# Patient Record
Sex: FEMALE | Race: BLACK OR AFRICAN AMERICAN | NOT HISPANIC OR LATINO
[De-identification: names, ages, dates, MRNs, and addresses within clinical notes are randomized per-mention and may not be internally consistent; named-entity substitution may affect disease eponyms.]

---

## 2017-01-12 ENCOUNTER — APPOINTMENT (OUTPATIENT)
Dept: FAMILY MEDICINE | Facility: CLINIC | Age: 42
End: 2017-01-12

## 2017-01-12 VITALS
RESPIRATION RATE: 20 BRPM | HEIGHT: 68 IN | BODY MASS INDEX: 44.41 KG/M2 | HEART RATE: 86 BPM | DIASTOLIC BLOOD PRESSURE: 70 MMHG | WEIGHT: 293 LBS | SYSTOLIC BLOOD PRESSURE: 126 MMHG

## 2017-01-12 RX ORDER — CYANOCOBALAMIN 1000 UG/ML
1000 INJECTION INTRAMUSCULAR; SUBCUTANEOUS
Qty: 0 | Refills: 0 | Status: COMPLETED | OUTPATIENT
Start: 2017-01-12

## 2017-01-12 RX ADMIN — CYANOCOBALAMIN 1 MCG/ML: 1000 INJECTION INTRAMUSCULAR; SUBCUTANEOUS at 00:00

## 2017-01-17 ENCOUNTER — CLINICAL ADVICE (OUTPATIENT)
Age: 42
End: 2017-01-17

## 2017-01-18 ENCOUNTER — OUTPATIENT (OUTPATIENT)
Dept: OUTPATIENT SERVICES | Facility: HOSPITAL | Age: 42
LOS: 1 days | End: 2017-01-18
Payer: MEDICARE

## 2017-01-18 DIAGNOSIS — K08.9 DISORDER OF TEETH AND SUPPORTING STRUCTURES, UNSPECIFIED: ICD-10-CM

## 2017-01-18 PROCEDURE — D0140: CPT

## 2017-01-23 ENCOUNTER — APPOINTMENT (OUTPATIENT)
Dept: FAMILY MEDICINE | Facility: CLINIC | Age: 42
End: 2017-01-23

## 2017-01-23 DIAGNOSIS — Z01.20 ENCOUNTER FOR DENTAL EXAMINATION AND CLEANING WITHOUT ABNORMAL FINDINGS: ICD-10-CM

## 2017-02-06 ENCOUNTER — APPOINTMENT (OUTPATIENT)
Dept: FAMILY MEDICINE | Facility: CLINIC | Age: 42
End: 2017-02-06

## 2017-02-06 ENCOUNTER — RX RENEWAL (OUTPATIENT)
Age: 42
End: 2017-02-06

## 2017-02-07 ENCOUNTER — RX RENEWAL (OUTPATIENT)
Age: 42
End: 2017-02-07

## 2017-02-10 ENCOUNTER — RX RENEWAL (OUTPATIENT)
Age: 42
End: 2017-02-10

## 2017-02-16 ENCOUNTER — APPOINTMENT (OUTPATIENT)
Dept: FAMILY MEDICINE | Facility: CLINIC | Age: 42
End: 2017-02-16

## 2017-03-03 ENCOUNTER — APPOINTMENT (OUTPATIENT)
Dept: FAMILY MEDICINE | Facility: CLINIC | Age: 42
End: 2017-03-03

## 2017-03-03 VITALS
HEART RATE: 78 BPM | HEIGHT: 68 IN | DIASTOLIC BLOOD PRESSURE: 78 MMHG | BODY MASS INDEX: 44.41 KG/M2 | WEIGHT: 293 LBS | SYSTOLIC BLOOD PRESSURE: 132 MMHG | RESPIRATION RATE: 20 BRPM

## 2017-03-03 RX ORDER — CYANOCOBALAMIN 1000 UG/ML
1000 INJECTION INTRAMUSCULAR; SUBCUTANEOUS
Qty: 0 | Refills: 0 | Status: COMPLETED | OUTPATIENT
Start: 2017-03-03

## 2017-03-03 RX ADMIN — CYANOCOBALAMIN 0 MCG/ML: 1000 INJECTION INTRAMUSCULAR; SUBCUTANEOUS at 00:00

## 2017-03-07 ENCOUNTER — RX RENEWAL (OUTPATIENT)
Age: 42
End: 2017-03-07

## 2017-03-07 LAB
25(OH)D3 SERPL-MCNC: 19.8 NG/ML
ALBUMIN SERPL ELPH-MCNC: 4.2 G/DL
ALP BLD-CCNC: 70 U/L
ALT SERPL-CCNC: 11 U/L
ANION GAP SERPL CALC-SCNC: 16 MMOL/L
AST SERPL-CCNC: 9 U/L
BASOPHILS # BLD AUTO: 0.03 K/UL
BASOPHILS NFR BLD AUTO: 0.3 %
BILIRUB SERPL-MCNC: <0.2 MG/DL
BUN SERPL-MCNC: 11 MG/DL
CALCIUM SERPL-MCNC: 9.5 MG/DL
CHLORIDE SERPL-SCNC: 104 MMOL/L
CHOLEST SERPL-MCNC: 204 MG/DL
CHOLEST/HDLC SERPL: 5.2 RATIO
CO2 SERPL-SCNC: 20 MMOL/L
CREAT SERPL-MCNC: 0.77 MG/DL
EOSINOPHIL # BLD AUTO: 0.12 K/UL
EOSINOPHIL NFR BLD AUTO: 1.3 %
GLUCOSE SERPL-MCNC: 137 MG/DL
HBA1C MFR BLD HPLC: 6.5 %
HCT VFR BLD CALC: 38.6 %
HDLC SERPL-MCNC: 39 MG/DL
HGB BLD-MCNC: 12.7 G/DL
IMM GRANULOCYTES NFR BLD AUTO: 0.2 %
LDLC SERPL CALC-MCNC: 122 MG/DL
LYMPHOCYTES # BLD AUTO: 2.75 K/UL
LYMPHOCYTES NFR BLD AUTO: 29.5 %
MAN DIFF?: NORMAL
MCHC RBC-ENTMCNC: 27.5 PG
MCHC RBC-ENTMCNC: 32.9 GM/DL
MCV RBC AUTO: 83.7 FL
MONOCYTES # BLD AUTO: 0.6 K/UL
MONOCYTES NFR BLD AUTO: 6.4 %
NEUTROPHILS # BLD AUTO: 5.8 K/UL
NEUTROPHILS NFR BLD AUTO: 62.3 %
PLATELET # BLD AUTO: 328 K/UL
POTASSIUM SERPL-SCNC: 4 MMOL/L
PROT SERPL-MCNC: 6.7 G/DL
RBC # BLD: 4.61 M/UL
RBC # FLD: 15.3 %
SODIUM SERPL-SCNC: 140 MMOL/L
TRIGL SERPL-MCNC: 215 MG/DL
VIT B12 SERPL-MCNC: 316 PG/ML
WBC # FLD AUTO: 9.32 K/UL

## 2017-03-17 ENCOUNTER — RX RENEWAL (OUTPATIENT)
Age: 42
End: 2017-03-17

## 2017-03-23 ENCOUNTER — RX RENEWAL (OUTPATIENT)
Age: 42
End: 2017-03-23

## 2017-03-29 ENCOUNTER — OUTPATIENT (OUTPATIENT)
Dept: OUTPATIENT SERVICES | Facility: HOSPITAL | Age: 42
LOS: 1 days | End: 2017-03-29
Payer: MEDICARE

## 2017-03-29 DIAGNOSIS — K08.9 DISORDER OF TEETH AND SUPPORTING STRUCTURES, UNSPECIFIED: ICD-10-CM

## 2017-03-29 PROCEDURE — D0140: CPT

## 2017-03-29 PROCEDURE — D0220: CPT

## 2017-03-31 DIAGNOSIS — Z01.20 ENCOUNTER FOR DENTAL EXAMINATION AND CLEANING WITHOUT ABNORMAL FINDINGS: ICD-10-CM

## 2017-04-03 ENCOUNTER — APPOINTMENT (OUTPATIENT)
Dept: FAMILY MEDICINE | Facility: CLINIC | Age: 42
End: 2017-04-03

## 2017-04-03 VITALS
WEIGHT: 293 LBS | HEART RATE: 76 BPM | HEIGHT: 68 IN | BODY MASS INDEX: 44.41 KG/M2 | DIASTOLIC BLOOD PRESSURE: 70 MMHG | SYSTOLIC BLOOD PRESSURE: 124 MMHG | RESPIRATION RATE: 20 BRPM

## 2017-04-03 RX ORDER — CYANOCOBALAMIN 1000 UG/ML
1000 INJECTION INTRAMUSCULAR; SUBCUTANEOUS
Qty: 0 | Refills: 0 | Status: COMPLETED | OUTPATIENT
Start: 2017-04-03

## 2017-04-03 RX ADMIN — CYANOCOBALAMIN 0 MCG/ML: 1000 INJECTION INTRAMUSCULAR; SUBCUTANEOUS at 00:00

## 2017-04-06 ENCOUNTER — RX RENEWAL (OUTPATIENT)
Age: 42
End: 2017-04-06

## 2017-04-21 ENCOUNTER — CLINICAL ADVICE (OUTPATIENT)
Age: 42
End: 2017-04-21

## 2017-04-21 ENCOUNTER — APPOINTMENT (OUTPATIENT)
Dept: FAMILY MEDICINE | Facility: CLINIC | Age: 42
End: 2017-04-21

## 2017-04-21 VITALS
TEMPERATURE: 98.4 F | HEART RATE: 80 BPM | DIASTOLIC BLOOD PRESSURE: 84 MMHG | SYSTOLIC BLOOD PRESSURE: 130 MMHG | RESPIRATION RATE: 16 BRPM | WEIGHT: 293 LBS | HEIGHT: 68 IN | BODY MASS INDEX: 44.41 KG/M2

## 2017-04-21 DIAGNOSIS — T14.8 OTHER INJURY OF UNSPECIFIED BODY REGION: ICD-10-CM

## 2017-04-28 ENCOUNTER — RX RENEWAL (OUTPATIENT)
Age: 42
End: 2017-04-28

## 2017-05-01 ENCOUNTER — RX RENEWAL (OUTPATIENT)
Age: 42
End: 2017-05-01

## 2017-05-08 ENCOUNTER — APPOINTMENT (OUTPATIENT)
Dept: FAMILY MEDICINE | Facility: CLINIC | Age: 42
End: 2017-05-08

## 2017-05-11 ENCOUNTER — MED ADMIN CHARGE (OUTPATIENT)
Age: 42
End: 2017-05-11

## 2017-05-11 ENCOUNTER — APPOINTMENT (OUTPATIENT)
Dept: FAMILY MEDICINE | Facility: CLINIC | Age: 42
End: 2017-05-11

## 2017-05-11 VITALS
DIASTOLIC BLOOD PRESSURE: 78 MMHG | SYSTOLIC BLOOD PRESSURE: 122 MMHG | OXYGEN SATURATION: 93 % | TEMPERATURE: 99.1 F | RESPIRATION RATE: 15 BRPM | HEART RATE: 88 BPM

## 2017-05-11 DIAGNOSIS — Z87.09 PERSONAL HISTORY OF OTHER DISEASES OF THE RESPIRATORY SYSTEM: ICD-10-CM

## 2017-05-11 RX ORDER — CYANOCOBALAMIN 1000 UG/ML
1000 INJECTION INTRAMUSCULAR; SUBCUTANEOUS
Qty: 0 | Refills: 0 | Status: COMPLETED | OUTPATIENT
Start: 2017-05-11

## 2017-05-11 RX ORDER — CEPHALEXIN 500 MG/1
500 CAPSULE ORAL TWICE DAILY
Qty: 14 | Refills: 0 | Status: DISCONTINUED | COMMUNITY
Start: 2017-04-21 | End: 2017-05-11

## 2017-05-11 RX ADMIN — CYANOCOBALAMIN 0 MCG/ML: 1000 INJECTION INTRAMUSCULAR; SUBCUTANEOUS at 00:00

## 2017-05-30 ENCOUNTER — RX RENEWAL (OUTPATIENT)
Age: 42
End: 2017-05-30

## 2017-06-16 ENCOUNTER — APPOINTMENT (OUTPATIENT)
Dept: FAMILY MEDICINE | Facility: CLINIC | Age: 42
End: 2017-06-16

## 2017-06-27 ENCOUNTER — RX RENEWAL (OUTPATIENT)
Age: 42
End: 2017-06-27

## 2017-07-18 ENCOUNTER — RX RENEWAL (OUTPATIENT)
Age: 42
End: 2017-07-18

## 2017-07-18 DIAGNOSIS — J30.9 ALLERGIC RHINITIS, UNSPECIFIED: ICD-10-CM

## 2017-07-24 ENCOUNTER — RX RENEWAL (OUTPATIENT)
Age: 42
End: 2017-07-24

## 2017-07-28 ENCOUNTER — RX RENEWAL (OUTPATIENT)
Age: 42
End: 2017-07-28

## 2017-08-05 ENCOUNTER — APPOINTMENT (OUTPATIENT)
Dept: FAMILY MEDICINE | Facility: CLINIC | Age: 42
End: 2017-08-05
Payer: MEDICARE

## 2017-08-05 PROCEDURE — 96372 THER/PROPH/DIAG INJ SC/IM: CPT

## 2017-08-05 RX ORDER — CYANOCOBALAMIN 1000 UG/ML
1000 INJECTION INTRAMUSCULAR; SUBCUTANEOUS
Qty: 0 | Refills: 0 | Status: COMPLETED | OUTPATIENT
Start: 2017-08-05

## 2017-08-05 RX ADMIN — CYANOCOBALAMIN 0 MCG/ML: 1000 INJECTION INTRAMUSCULAR; SUBCUTANEOUS at 00:00

## 2017-08-10 ENCOUNTER — RX RENEWAL (OUTPATIENT)
Age: 42
End: 2017-08-10

## 2017-08-16 ENCOUNTER — FORM ENCOUNTER (OUTPATIENT)
Age: 42
End: 2017-08-16

## 2017-08-17 ENCOUNTER — APPOINTMENT (OUTPATIENT)
Dept: RADIOLOGY | Facility: HOSPITAL | Age: 42
End: 2017-08-17
Payer: MEDICARE

## 2017-08-17 ENCOUNTER — APPOINTMENT (OUTPATIENT)
Dept: FAMILY MEDICINE | Facility: CLINIC | Age: 42
End: 2017-08-17

## 2017-08-17 ENCOUNTER — OUTPATIENT (OUTPATIENT)
Dept: OUTPATIENT SERVICES | Facility: HOSPITAL | Age: 42
LOS: 1 days | End: 2017-08-17
Payer: MEDICARE

## 2017-08-17 DIAGNOSIS — M17.11 UNILATERAL PRIMARY OSTEOARTHRITIS, RIGHT KNEE: ICD-10-CM

## 2017-08-17 DIAGNOSIS — M79.642 PAIN IN LEFT HAND: ICD-10-CM

## 2017-08-17 PROCEDURE — 73130 X-RAY EXAM OF HAND: CPT | Mod: 26,LT

## 2017-08-17 PROCEDURE — 73562 X-RAY EXAM OF KNEE 3: CPT

## 2017-08-17 PROCEDURE — 73130 X-RAY EXAM OF HAND: CPT

## 2017-08-17 PROCEDURE — 73562 X-RAY EXAM OF KNEE 3: CPT | Mod: 26,50

## 2017-08-23 ENCOUNTER — OUTPATIENT (OUTPATIENT)
Dept: OUTPATIENT SERVICES | Facility: HOSPITAL | Age: 42
LOS: 1 days | End: 2017-08-23
Payer: MEDICARE

## 2017-08-23 DIAGNOSIS — K08.9 DISORDER OF TEETH AND SUPPORTING STRUCTURES, UNSPECIFIED: ICD-10-CM

## 2017-08-23 DIAGNOSIS — K02.9 DENTAL CARIES, UNSPECIFIED: ICD-10-CM

## 2017-08-23 PROCEDURE — D7210: CPT

## 2017-08-24 ENCOUNTER — RX RENEWAL (OUTPATIENT)
Age: 42
End: 2017-08-24

## 2017-09-01 ENCOUNTER — CLINICAL ADVICE (OUTPATIENT)
Age: 42
End: 2017-09-01

## 2017-09-05 ENCOUNTER — RX RENEWAL (OUTPATIENT)
Age: 42
End: 2017-09-05

## 2017-09-18 ENCOUNTER — APPOINTMENT (OUTPATIENT)
Dept: FAMILY MEDICINE | Facility: CLINIC | Age: 42
End: 2017-09-18

## 2017-09-21 ENCOUNTER — RX RENEWAL (OUTPATIENT)
Age: 42
End: 2017-09-21

## 2017-10-06 ENCOUNTER — CLINICAL ADVICE (OUTPATIENT)
Age: 42
End: 2017-10-06

## 2017-10-17 ENCOUNTER — RX RENEWAL (OUTPATIENT)
Age: 42
End: 2017-10-17

## 2017-10-19 ENCOUNTER — RX RENEWAL (OUTPATIENT)
Age: 42
End: 2017-10-19

## 2017-10-20 ENCOUNTER — APPOINTMENT (OUTPATIENT)
Dept: FAMILY MEDICINE | Facility: CLINIC | Age: 42
End: 2017-10-20
Payer: MEDICARE

## 2017-10-20 VITALS
SYSTOLIC BLOOD PRESSURE: 130 MMHG | RESPIRATION RATE: 20 BRPM | HEART RATE: 78 BPM | BODY MASS INDEX: 44.41 KG/M2 | WEIGHT: 293 LBS | HEIGHT: 68 IN | DIASTOLIC BLOOD PRESSURE: 72 MMHG

## 2017-10-20 PROCEDURE — 99214 OFFICE O/P EST MOD 30 MIN: CPT | Mod: 25

## 2017-10-20 PROCEDURE — 96372 THER/PROPH/DIAG INJ SC/IM: CPT

## 2017-10-20 PROCEDURE — 36415 COLL VENOUS BLD VENIPUNCTURE: CPT

## 2017-10-20 RX ORDER — CYANOCOBALAMIN 1000 UG/ML
1000 INJECTION INTRAMUSCULAR; SUBCUTANEOUS
Qty: 0 | Refills: 0 | Status: COMPLETED | OUTPATIENT
Start: 2017-10-20

## 2017-10-20 RX ADMIN — CYANOCOBALAMIN 0 MCG/ML: 1000 INJECTION INTRAMUSCULAR; SUBCUTANEOUS at 00:00

## 2017-10-28 LAB
ALBUMIN SERPL ELPH-MCNC: 4.5 G/DL
ALP BLD-CCNC: 82 U/L
ALT SERPL-CCNC: 11 U/L
ANION GAP SERPL CALC-SCNC: 18 MMOL/L
AST SERPL-CCNC: 10 U/L
BASOPHILS # BLD AUTO: 0.02 K/UL
BASOPHILS NFR BLD AUTO: 0.3 %
BILIRUB SERPL-MCNC: <0.2 MG/DL
BUN SERPL-MCNC: 14 MG/DL
CALCIUM SERPL-MCNC: 9.7 MG/DL
CHLORIDE SERPL-SCNC: 103 MMOL/L
CHOLEST SERPL-MCNC: 247 MG/DL
CHOLEST/HDLC SERPL: 6.2 RATIO
CO2 SERPL-SCNC: 20 MMOL/L
CREAT SERPL-MCNC: 0.83 MG/DL
CRP SERPL-MCNC: 1.5 MG/DL
DSDNA AB SER-ACNC: <12 IU/ML
EOSINOPHIL # BLD AUTO: 0.04 K/UL
EOSINOPHIL NFR BLD AUTO: 0.6 %
ERYTHROCYTE [SEDIMENTATION RATE] IN BLOOD BY WESTERGREN METHOD: 41 MM/HR
GLUCOSE SERPL-MCNC: 113 MG/DL
HBA1C MFR BLD HPLC: 6.5 %
HCT VFR BLD CALC: 39.7 %
HDLC SERPL-MCNC: 40 MG/DL
HGB BLD-MCNC: 12.8 G/DL
IMM GRANULOCYTES NFR BLD AUTO: 0.3 %
LDLC SERPL CALC-MCNC: 153 MG/DL
LYMPHOCYTES # BLD AUTO: 1.99 K/UL
LYMPHOCYTES NFR BLD AUTO: 29.3 %
MAN DIFF?: NORMAL
MCHC RBC-ENTMCNC: 27.3 PG
MCHC RBC-ENTMCNC: 32.2 GM/DL
MCV RBC AUTO: 84.6 FL
MONOCYTES # BLD AUTO: 0.59 K/UL
MONOCYTES NFR BLD AUTO: 8.7 %
NEUTROPHILS # BLD AUTO: 4.13 K/UL
NEUTROPHILS NFR BLD AUTO: 60.8 %
PLATELET # BLD AUTO: 330 K/UL
POTASSIUM SERPL-SCNC: 4.3 MMOL/L
PROT SERPL-MCNC: 7.2 G/DL
RBC # BLD: 4.69 M/UL
RBC # FLD: 15.1 %
SODIUM SERPL-SCNC: 141 MMOL/L
T4 FREE SERPL-MCNC: 0.9 NG/DL
TRIGL SERPL-MCNC: 272 MG/DL
TSH SERPL-ACNC: 2.64 UIU/ML
VIT B12 SERPL-MCNC: 221 PG/ML
WBC # FLD AUTO: 6.79 K/UL

## 2017-10-31 ENCOUNTER — RESULT REVIEW (OUTPATIENT)
Age: 42
End: 2017-10-31

## 2017-11-16 ENCOUNTER — RX RENEWAL (OUTPATIENT)
Age: 42
End: 2017-11-16

## 2017-11-30 ENCOUNTER — APPOINTMENT (OUTPATIENT)
Dept: FAMILY MEDICINE | Facility: CLINIC | Age: 42
End: 2017-11-30

## 2017-12-01 ENCOUNTER — APPOINTMENT (OUTPATIENT)
Dept: FAMILY MEDICINE | Facility: CLINIC | Age: 42
End: 2017-12-01
Payer: MEDICARE

## 2017-12-01 PROCEDURE — 96372 THER/PROPH/DIAG INJ SC/IM: CPT

## 2017-12-01 RX ORDER — CYANOCOBALAMIN 1000 UG/ML
1000 INJECTION INTRAMUSCULAR; SUBCUTANEOUS
Qty: 0 | Refills: 0 | Status: COMPLETED | OUTPATIENT
Start: 2017-12-01

## 2017-12-01 RX ADMIN — CYANOCOBALAMIN 0 MCG/ML: 1000 INJECTION INTRAMUSCULAR; SUBCUTANEOUS at 00:00

## 2017-12-12 ENCOUNTER — APPOINTMENT (OUTPATIENT)
Dept: ULTRASOUND IMAGING | Facility: HOSPITAL | Age: 42
End: 2017-12-12

## 2017-12-12 ENCOUNTER — APPOINTMENT (OUTPATIENT)
Dept: MAMMOGRAPHY | Facility: HOSPITAL | Age: 42
End: 2017-12-12

## 2017-12-15 ENCOUNTER — RX RENEWAL (OUTPATIENT)
Age: 42
End: 2017-12-15

## 2017-12-22 ENCOUNTER — RX RENEWAL (OUTPATIENT)
Age: 42
End: 2017-12-22

## 2018-01-04 ENCOUNTER — APPOINTMENT (OUTPATIENT)
Dept: FAMILY MEDICINE | Facility: CLINIC | Age: 43
End: 2018-01-04

## 2018-01-08 ENCOUNTER — OUTPATIENT (OUTPATIENT)
Dept: OUTPATIENT SERVICES | Facility: HOSPITAL | Age: 43
LOS: 1 days | End: 2018-01-08
Payer: MEDICARE

## 2018-01-08 ENCOUNTER — APPOINTMENT (OUTPATIENT)
Dept: ORTHOPEDIC SURGERY | Facility: CLINIC | Age: 43
End: 2018-01-08
Payer: MEDICARE

## 2018-01-08 ENCOUNTER — APPOINTMENT (OUTPATIENT)
Dept: RADIOLOGY | Facility: HOSPITAL | Age: 43
End: 2018-01-08
Payer: MEDICARE

## 2018-01-08 VITALS
HEART RATE: 94 BPM | DIASTOLIC BLOOD PRESSURE: 96 MMHG | WEIGHT: 293 LBS | SYSTOLIC BLOOD PRESSURE: 158 MMHG | HEIGHT: 68 IN | BODY MASS INDEX: 44.41 KG/M2

## 2018-01-08 DIAGNOSIS — Z00.8 ENCOUNTER FOR OTHER GENERAL EXAMINATION: ICD-10-CM

## 2018-01-08 DIAGNOSIS — M79.672 PAIN IN LEFT FOOT: ICD-10-CM

## 2018-01-08 PROCEDURE — 99214 OFFICE O/P EST MOD 30 MIN: CPT

## 2018-01-08 PROCEDURE — 73630 X-RAY EXAM OF FOOT: CPT

## 2018-01-08 PROCEDURE — 73630 X-RAY EXAM OF FOOT: CPT | Mod: 26,LT

## 2018-01-12 ENCOUNTER — RX RENEWAL (OUTPATIENT)
Age: 43
End: 2018-01-12

## 2018-01-16 ENCOUNTER — FORM ENCOUNTER (OUTPATIENT)
Age: 43
End: 2018-01-16

## 2018-01-17 ENCOUNTER — APPOINTMENT (OUTPATIENT)
Dept: MRI IMAGING | Facility: HOSPITAL | Age: 43
End: 2018-01-17

## 2018-01-17 ENCOUNTER — OUTPATIENT (OUTPATIENT)
Dept: OUTPATIENT SERVICES | Facility: HOSPITAL | Age: 43
LOS: 1 days | End: 2018-01-17
Payer: MEDICARE

## 2018-01-17 DIAGNOSIS — Z00.8 ENCOUNTER FOR OTHER GENERAL EXAMINATION: ICD-10-CM

## 2018-01-17 DIAGNOSIS — M19.072 PRIMARY OSTEOARTHRITIS, LEFT ANKLE AND FOOT: ICD-10-CM

## 2018-01-17 PROCEDURE — 73718 MRI LOWER EXTREMITY W/O DYE: CPT | Mod: 26,LT

## 2018-01-17 PROCEDURE — 73718 MRI LOWER EXTREMITY W/O DYE: CPT

## 2018-01-19 ENCOUNTER — RESULT REVIEW (OUTPATIENT)
Age: 43
End: 2018-01-19

## 2018-01-19 ENCOUNTER — APPOINTMENT (OUTPATIENT)
Dept: FAMILY MEDICINE | Facility: CLINIC | Age: 43
End: 2018-01-19

## 2018-01-20 ENCOUNTER — RX RENEWAL (OUTPATIENT)
Age: 43
End: 2018-01-20

## 2018-02-05 ENCOUNTER — RX RENEWAL (OUTPATIENT)
Age: 43
End: 2018-02-05

## 2018-02-06 ENCOUNTER — RX RENEWAL (OUTPATIENT)
Age: 43
End: 2018-02-06

## 2018-02-08 ENCOUNTER — RX RENEWAL (OUTPATIENT)
Age: 43
End: 2018-02-08

## 2018-02-12 ENCOUNTER — RX RENEWAL (OUTPATIENT)
Age: 43
End: 2018-02-12

## 2018-02-13 ENCOUNTER — RX RENEWAL (OUTPATIENT)
Age: 43
End: 2018-02-13

## 2018-02-15 ENCOUNTER — RX RENEWAL (OUTPATIENT)
Age: 43
End: 2018-02-15

## 2018-03-15 ENCOUNTER — RX RENEWAL (OUTPATIENT)
Age: 43
End: 2018-03-15

## 2018-03-26 ENCOUNTER — APPOINTMENT (OUTPATIENT)
Dept: FAMILY MEDICINE | Facility: CLINIC | Age: 43
End: 2018-03-26
Payer: MEDICARE

## 2018-03-26 VITALS
SYSTOLIC BLOOD PRESSURE: 112 MMHG | BODY MASS INDEX: 44.41 KG/M2 | HEIGHT: 68 IN | WEIGHT: 293 LBS | HEART RATE: 78 BPM | DIASTOLIC BLOOD PRESSURE: 70 MMHG | RESPIRATION RATE: 20 BRPM

## 2018-03-26 PROCEDURE — 99214 OFFICE O/P EST MOD 30 MIN: CPT | Mod: 25

## 2018-03-26 PROCEDURE — 96372 THER/PROPH/DIAG INJ SC/IM: CPT

## 2018-03-26 PROCEDURE — 36415 COLL VENOUS BLD VENIPUNCTURE: CPT

## 2018-03-26 RX ORDER — CYANOCOBALAMIN 1000 UG/ML
1000 INJECTION INTRAMUSCULAR; SUBCUTANEOUS
Qty: 0 | Refills: 0 | Status: COMPLETED | OUTPATIENT
Start: 2018-03-26

## 2018-03-26 RX ADMIN — CYANOCOBALAMIN 0 MCG/ML: 1000 INJECTION INTRAMUSCULAR; SUBCUTANEOUS at 00:00

## 2018-03-27 ENCOUNTER — RESULT REVIEW (OUTPATIENT)
Age: 43
End: 2018-03-27

## 2018-03-27 LAB
25(OH)D3 SERPL-MCNC: 26.6 NG/ML
ALBUMIN SERPL ELPH-MCNC: 4.1 G/DL
ALP BLD-CCNC: 74 U/L
ALT SERPL-CCNC: 13 U/L
ANION GAP SERPL CALC-SCNC: 18 MMOL/L
AST SERPL-CCNC: 11 U/L
BASOPHILS # BLD AUTO: 0.01 K/UL
BASOPHILS NFR BLD AUTO: 0.1 %
BILIRUB SERPL-MCNC: 0.2 MG/DL
BUN SERPL-MCNC: 10 MG/DL
CALCIUM SERPL-MCNC: 9.3 MG/DL
CHLORIDE SERPL-SCNC: 104 MMOL/L
CHOLEST SERPL-MCNC: 250 MG/DL
CHOLEST/HDLC SERPL: 5 RATIO
CO2 SERPL-SCNC: 19 MMOL/L
CREAT SERPL-MCNC: 0.91 MG/DL
EOSINOPHIL # BLD AUTO: 0.06 K/UL
EOSINOPHIL NFR BLD AUTO: 0.8 %
GLUCOSE SERPL-MCNC: 111 MG/DL
HBA1C MFR BLD HPLC: 6.5 %
HCT VFR BLD CALC: 41.3 %
HDLC SERPL-MCNC: 50 MG/DL
HGB BLD-MCNC: 13.6 G/DL
IMM GRANULOCYTES NFR BLD AUTO: 0.1 %
LDLC SERPL CALC-MCNC: 180 MG/DL
LYMPHOCYTES # BLD AUTO: 2.9 K/UL
LYMPHOCYTES NFR BLD AUTO: 37.7 %
MAN DIFF?: NORMAL
MCHC RBC-ENTMCNC: 27.6 PG
MCHC RBC-ENTMCNC: 32.9 GM/DL
MCV RBC AUTO: 83.8 FL
MONOCYTES # BLD AUTO: 0.6 K/UL
MONOCYTES NFR BLD AUTO: 7.8 %
NEUTROPHILS # BLD AUTO: 4.11 K/UL
NEUTROPHILS NFR BLD AUTO: 53.5 %
PLATELET # BLD AUTO: 354 K/UL
POTASSIUM SERPL-SCNC: 4.4 MMOL/L
PROT SERPL-MCNC: 6.9 G/DL
RBC # BLD: 4.93 M/UL
RBC # FLD: 16 %
SODIUM SERPL-SCNC: 141 MMOL/L
T4 FREE SERPL-MCNC: 1.1 NG/DL
TRIGL SERPL-MCNC: 98 MG/DL
TSH SERPL-ACNC: 4.05 UIU/ML
VIT B12 SERPL-MCNC: 341 PG/ML
WBC # FLD AUTO: 7.69 K/UL

## 2018-04-03 ENCOUNTER — RX RENEWAL (OUTPATIENT)
Age: 43
End: 2018-04-03

## 2018-04-12 ENCOUNTER — RX RENEWAL (OUTPATIENT)
Age: 43
End: 2018-04-12

## 2018-04-26 ENCOUNTER — APPOINTMENT (OUTPATIENT)
Dept: FAMILY MEDICINE | Facility: CLINIC | Age: 43
End: 2018-04-26
Payer: MEDICARE

## 2018-04-26 PROCEDURE — 96372 THER/PROPH/DIAG INJ SC/IM: CPT

## 2018-04-26 RX ORDER — CYANOCOBALAMIN 1000 UG/ML
1000 INJECTION INTRAMUSCULAR; SUBCUTANEOUS
Qty: 0 | Refills: 0 | Status: COMPLETED | OUTPATIENT
Start: 2018-04-26

## 2018-04-26 RX ADMIN — CYANOCOBALAMIN 0 MCG/ML: 1000 INJECTION INTRAMUSCULAR; SUBCUTANEOUS at 00:00

## 2018-05-03 ENCOUNTER — RX RENEWAL (OUTPATIENT)
Age: 43
End: 2018-05-03

## 2018-05-10 ENCOUNTER — RX RENEWAL (OUTPATIENT)
Age: 43
End: 2018-05-10

## 2018-05-24 ENCOUNTER — APPOINTMENT (OUTPATIENT)
Dept: FAMILY MEDICINE | Facility: CLINIC | Age: 43
End: 2018-05-24

## 2018-06-07 ENCOUNTER — APPOINTMENT (OUTPATIENT)
Dept: FAMILY MEDICINE | Facility: CLINIC | Age: 43
End: 2018-06-07
Payer: MEDICARE

## 2018-06-07 PROCEDURE — 96372 THER/PROPH/DIAG INJ SC/IM: CPT

## 2018-06-07 RX ORDER — CYANOCOBALAMIN 1000 UG/ML
1000 INJECTION INTRAMUSCULAR; SUBCUTANEOUS
Qty: 0 | Refills: 0 | Status: COMPLETED | OUTPATIENT
Start: 2018-06-07

## 2018-06-07 RX ADMIN — CYANOCOBALAMIN 0 MCG/ML: 1000 INJECTION INTRAMUSCULAR; SUBCUTANEOUS at 00:00

## 2018-06-13 ENCOUNTER — OTHER (OUTPATIENT)
Age: 43
End: 2018-06-13

## 2018-06-15 ENCOUNTER — APPOINTMENT (OUTPATIENT)
Dept: ORTHOPEDIC SURGERY | Facility: CLINIC | Age: 43
End: 2018-06-15
Payer: MEDICARE

## 2018-06-15 DIAGNOSIS — M51.26 OTHER INTERVERTEBRAL DISC DISPLACEMENT, LUMBAR REGION: ICD-10-CM

## 2018-06-15 PROCEDURE — 99214 OFFICE O/P EST MOD 30 MIN: CPT

## 2018-07-05 ENCOUNTER — APPOINTMENT (OUTPATIENT)
Dept: FAMILY MEDICINE | Facility: CLINIC | Age: 43
End: 2018-07-05
Payer: MEDICARE

## 2018-07-05 VITALS
SYSTOLIC BLOOD PRESSURE: 125 MMHG | HEIGHT: 68 IN | BODY MASS INDEX: 44.41 KG/M2 | HEART RATE: 76 BPM | WEIGHT: 293 LBS | RESPIRATION RATE: 20 BRPM | DIASTOLIC BLOOD PRESSURE: 70 MMHG

## 2018-07-05 PROCEDURE — 99406 BEHAV CHNG SMOKING 3-10 MIN: CPT

## 2018-07-05 PROCEDURE — 36415 COLL VENOUS BLD VENIPUNCTURE: CPT

## 2018-07-05 PROCEDURE — 99214 OFFICE O/P EST MOD 30 MIN: CPT | Mod: 25

## 2018-07-05 PROCEDURE — 96372 THER/PROPH/DIAG INJ SC/IM: CPT

## 2018-07-05 RX ORDER — CYANOCOBALAMIN 1000 UG/ML
1000 INJECTION INTRAMUSCULAR; SUBCUTANEOUS
Qty: 0 | Refills: 0 | Status: COMPLETED | OUTPATIENT
Start: 2018-07-05

## 2018-07-05 RX ADMIN — CYANOCOBALAMIN 0 MCG/ML: 1000 INJECTION INTRAMUSCULAR; SUBCUTANEOUS at 00:00

## 2018-07-05 NOTE — ASSESSMENT
[FreeTextEntry1] : BP stable; findings otherwise consistent with history\par Feel chronic issues well managed with current medication regime with no obvious adverse effects\par Lab profiles sent\par B12 1ml IM given R deltoid

## 2018-07-05 NOTE — HISTORY OF PRESENT ILLNESS
[Action] : Action: patient is following a plan to lose weight [Good understanding] : Patient has a good understanding of disease, goals and obesity follow-up plan [Discussed Risks and Advised to Quit Smoking] : Discussed risks and advised to quit smoking [Smoking Cessation Counseling Given (more than 3 min less than10 min) and Resources Provided] : Smoking cessation counseling given (more than 3 min less than 10 min) and resources provided [Ready] : Patient is ready for cessation intervention [de-identified] : discussed for 5 minutes [de-identified] : Presents for BP check, labs; also requests monthly B12 injection.  Reviewed all medication - using all medication very appropriately to maintain daily activities and QOL; note pt is alert, conversant, with no evidence of intoxication or euphoria.

## 2018-07-05 NOTE — PHYSICAL EXAM
[No Acute Distress] : no acute distress [Supple] : supple [No Respiratory Distress] : no respiratory distress  [Clear to Auscultation] : lungs were clear to auscultation bilaterally [No Accessory Muscle Use] : no accessory muscle use [Normal Rate] : normal rate  [Regular Rhythm] : with a regular rhythm [Normal S1, S2] : normal S1 and S2 [No Murmur] : no murmur heard [No Edema] : there was no peripheral edema [Soft] : abdomen soft [Non Tender] : non-tender [Apractic] : apractic [Shuffling] : shuffling [Motor Strength Lower Extremities Bilaterally] : there was weakness in both lower extremities [No Focal Deficits] : no focal deficits [Alert and Oriented x3] : oriented to person, place, and time

## 2018-07-05 NOTE — COUNSELING
[Weight management counseling provided] : Weight management [Healthy eating counseling provided] : healthy eating [Activity counseling provided] : activity [Smoking cessation counseling provided] : smoking cessation [None] : None [Good understanding] : Patient has a good understanding of lifestyle changes and the steps needed to achieve self management goals

## 2018-07-06 ENCOUNTER — RX RENEWAL (OUTPATIENT)
Age: 43
End: 2018-07-06

## 2018-07-06 LAB
25(OH)D3 SERPL-MCNC: 35.2 NG/ML
ALBUMIN SERPL ELPH-MCNC: 4.3 G/DL
ALP BLD-CCNC: 71 U/L
ALT SERPL-CCNC: 15 U/L
ANION GAP SERPL CALC-SCNC: 15 MMOL/L
AST SERPL-CCNC: 16 U/L
BASOPHILS # BLD AUTO: 0.02 K/UL
BASOPHILS NFR BLD AUTO: 0.3 %
BILIRUB SERPL-MCNC: 0.2 MG/DL
BUN SERPL-MCNC: 6 MG/DL
CALCIUM SERPL-MCNC: 9.1 MG/DL
CHLORIDE SERPL-SCNC: 104 MMOL/L
CHOLEST SERPL-MCNC: 223 MG/DL
CHOLEST/HDLC SERPL: 5.4 RATIO
CO2 SERPL-SCNC: 23 MMOL/L
CREAT SERPL-MCNC: 0.75 MG/DL
EOSINOPHIL # BLD AUTO: 0.09 K/UL
EOSINOPHIL NFR BLD AUTO: 1.3 %
FOLATE SERPL-MCNC: 6 NG/ML
GLUCOSE SERPL-MCNC: 123 MG/DL
HBA1C MFR BLD HPLC: 6.2 %
HCT VFR BLD CALC: 39.8 %
HDLC SERPL-MCNC: 41 MG/DL
HGB BLD-MCNC: 12.4 G/DL
IMM GRANULOCYTES NFR BLD AUTO: 0.3 %
LDLC SERPL CALC-MCNC: 154 MG/DL
LYMPHOCYTES # BLD AUTO: 2 K/UL
LYMPHOCYTES NFR BLD AUTO: 28.6 %
MAN DIFF?: NORMAL
MCHC RBC-ENTMCNC: 27.1 PG
MCHC RBC-ENTMCNC: 31.2 GM/DL
MCV RBC AUTO: 86.9 FL
MONOCYTES # BLD AUTO: 0.43 K/UL
MONOCYTES NFR BLD AUTO: 6.2 %
NEUTROPHILS # BLD AUTO: 4.43 K/UL
NEUTROPHILS NFR BLD AUTO: 63.3 %
PLATELET # BLD AUTO: 325 K/UL
POTASSIUM SERPL-SCNC: 3.8 MMOL/L
PROT SERPL-MCNC: 6.8 G/DL
RBC # BLD: 4.58 M/UL
RBC # FLD: 15.3 %
SODIUM SERPL-SCNC: 142 MMOL/L
T4 FREE SERPL-MCNC: 1 NG/DL
TRIGL SERPL-MCNC: 139 MG/DL
TSH SERPL-ACNC: 2.9 UIU/ML
VIT B12 SERPL-MCNC: 350 PG/ML
WBC # FLD AUTO: 6.99 K/UL

## 2018-07-26 DIAGNOSIS — S90.30XA CONTUSION OF UNSPECIFIED FOOT, INITIAL ENCOUNTER: ICD-10-CM

## 2018-07-28 ENCOUNTER — EMERGENCY (EMERGENCY)
Facility: HOSPITAL | Age: 43
LOS: 1 days | Discharge: HOME | End: 2018-07-28
Attending: EMERGENCY MEDICINE | Admitting: EMERGENCY MEDICINE
Payer: MEDICARE

## 2018-07-28 VITALS
SYSTOLIC BLOOD PRESSURE: 169 MMHG | RESPIRATION RATE: 16 BRPM | TEMPERATURE: 99 F | WEIGHT: 293 LBS | DIASTOLIC BLOOD PRESSURE: 92 MMHG | HEART RATE: 81 BPM | HEIGHT: 68 IN | OXYGEN SATURATION: 96 %

## 2018-07-28 DIAGNOSIS — T65.891A TOXIC EFFECT OF OTHER SPECIFIED SUBSTANCES, ACCIDENTAL (UNINTENTIONAL), INITIAL ENCOUNTER: ICD-10-CM

## 2018-07-28 PROCEDURE — 99283 EMERGENCY DEPT VISIT LOW MDM: CPT

## 2018-07-28 RX ORDER — IBUPROFEN 200 MG
600 TABLET ORAL ONCE
Qty: 0 | Refills: 0 | Status: COMPLETED | OUTPATIENT
Start: 2018-07-28 | End: 2018-07-28

## 2018-07-28 RX ORDER — FLUOXETINE HCL 10 MG
0 CAPSULE ORAL
Qty: 0 | Refills: 0 | COMMUNITY

## 2018-07-28 RX ORDER — CHOLECALCIFEROL (VITAMIN D3) 125 MCG
0 CAPSULE ORAL
Qty: 0 | Refills: 0 | COMMUNITY

## 2018-07-28 RX ORDER — OLMESARTAN MEDOXOMIL 5 MG/1
0 TABLET, FILM COATED ORAL
Qty: 0 | Refills: 0 | COMMUNITY

## 2018-07-28 RX ORDER — ALPRAZOLAM 0.25 MG
0 TABLET ORAL
Qty: 0 | Refills: 0 | COMMUNITY

## 2018-07-28 RX ORDER — ALBUTEROL 90 UG/1
0 AEROSOL, METERED ORAL
Qty: 0 | Refills: 0 | COMMUNITY

## 2018-07-28 RX ORDER — CLONAZEPAM 1 MG
0 TABLET ORAL
Qty: 0 | Refills: 0 | COMMUNITY

## 2018-07-28 RX ORDER — FLUTICASONE PROPIONATE 220 MCG
0 AEROSOL WITH ADAPTER (GRAM) INHALATION
Qty: 0 | Refills: 0 | COMMUNITY

## 2018-07-28 RX ORDER — AMLODIPINE BESYLATE 2.5 MG/1
0 TABLET ORAL
Qty: 0 | Refills: 0 | COMMUNITY

## 2018-07-28 RX ORDER — QUETIAPINE FUMARATE 200 MG/1
1 TABLET, FILM COATED ORAL
Qty: 0 | Refills: 0 | COMMUNITY

## 2018-07-28 RX ADMIN — Medication 600 MILLIGRAM(S): at 21:39
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
## 2018-08-02 ENCOUNTER — RX RENEWAL (OUTPATIENT)
Age: 43
End: 2018-08-02

## 2018-08-06 ENCOUNTER — APPOINTMENT (OUTPATIENT)
Dept: FAMILY MEDICINE | Facility: CLINIC | Age: 43
End: 2018-08-06

## 2018-08-06 ENCOUNTER — RX RENEWAL (OUTPATIENT)
Age: 43
End: 2018-08-06

## 2018-09-04 ENCOUNTER — RX RENEWAL (OUTPATIENT)
Age: 43
End: 2018-09-04

## 2018-09-10 ENCOUNTER — APPOINTMENT (OUTPATIENT)
Dept: FAMILY MEDICINE | Facility: CLINIC | Age: 43
End: 2018-09-10
Payer: MEDICARE

## 2018-09-10 PROCEDURE — 96372 THER/PROPH/DIAG INJ SC/IM: CPT

## 2018-09-10 RX ORDER — CYANOCOBALAMIN 1000 UG/ML
1000 INJECTION INTRAMUSCULAR; SUBCUTANEOUS
Qty: 0 | Refills: 0 | Status: COMPLETED | OUTPATIENT
Start: 2018-09-10

## 2018-09-10 RX ADMIN — CYANOCOBALAMIN 0 MCG/ML: 1000 INJECTION INTRAMUSCULAR; SUBCUTANEOUS at 00:00

## 2018-10-02 ENCOUNTER — FORM ENCOUNTER (OUTPATIENT)
Age: 43
End: 2018-10-02

## 2018-10-03 ENCOUNTER — APPOINTMENT (OUTPATIENT)
Dept: MAMMOGRAPHY | Facility: HOSPITAL | Age: 43
End: 2018-10-03
Payer: MEDICARE

## 2018-10-03 ENCOUNTER — APPOINTMENT (OUTPATIENT)
Dept: ULTRASOUND IMAGING | Facility: HOSPITAL | Age: 43
End: 2018-10-03
Payer: MEDICARE

## 2018-10-03 ENCOUNTER — OUTPATIENT (OUTPATIENT)
Dept: OUTPATIENT SERVICES | Facility: HOSPITAL | Age: 43
LOS: 1 days | End: 2018-10-03

## 2018-10-03 ENCOUNTER — OUTPATIENT (OUTPATIENT)
Dept: OUTPATIENT SERVICES | Facility: HOSPITAL | Age: 43
LOS: 1 days | End: 2018-10-03
Payer: MEDICARE

## 2018-10-03 DIAGNOSIS — Z00.8 ENCOUNTER FOR OTHER GENERAL EXAMINATION: ICD-10-CM

## 2018-10-03 PROBLEM — J45.909 UNSPECIFIED ASTHMA, UNCOMPLICATED: Chronic | Status: ACTIVE | Noted: 2018-07-28

## 2018-10-03 PROBLEM — F31.9 BIPOLAR DISORDER, UNSPECIFIED: Chronic | Status: ACTIVE | Noted: 2018-07-28

## 2018-10-03 PROBLEM — M19.90 UNSPECIFIED OSTEOARTHRITIS, UNSPECIFIED SITE: Chronic | Status: ACTIVE | Noted: 2018-07-28

## 2018-10-03 PROBLEM — M54.9 DORSALGIA, UNSPECIFIED: Chronic | Status: ACTIVE | Noted: 2018-07-28

## 2018-10-03 PROBLEM — I10 ESSENTIAL (PRIMARY) HYPERTENSION: Chronic | Status: ACTIVE | Noted: 2018-07-28

## 2018-10-03 PROCEDURE — 76830 TRANSVAGINAL US NON-OB: CPT | Mod: 26

## 2018-10-03 PROCEDURE — 76856 US EXAM PELVIC COMPLETE: CPT

## 2018-10-03 PROCEDURE — 76856 US EXAM PELVIC COMPLETE: CPT | Mod: 26

## 2018-10-03 PROCEDURE — 77063 BREAST TOMOSYNTHESIS BI: CPT

## 2018-10-03 PROCEDURE — 76641 ULTRASOUND BREAST COMPLETE: CPT | Mod: 26

## 2018-10-03 PROCEDURE — 77067 SCR MAMMO BI INCL CAD: CPT

## 2018-10-03 PROCEDURE — 77067 SCR MAMMO BI INCL CAD: CPT | Mod: 26

## 2018-10-03 PROCEDURE — 77063 BREAST TOMOSYNTHESIS BI: CPT | Mod: 26

## 2018-10-03 PROCEDURE — 76641 ULTRASOUND BREAST COMPLETE: CPT

## 2018-10-03 PROCEDURE — 76830 TRANSVAGINAL US NON-OB: CPT

## 2018-10-04 ENCOUNTER — RX RENEWAL (OUTPATIENT)
Age: 43
End: 2018-10-04

## 2018-10-04 ENCOUNTER — RESULT REVIEW (OUTPATIENT)
Age: 43
End: 2018-10-04

## 2018-10-05 ENCOUNTER — APPOINTMENT (OUTPATIENT)
Dept: FAMILY MEDICINE | Facility: CLINIC | Age: 43
End: 2018-10-05

## 2018-10-12 ENCOUNTER — APPOINTMENT (OUTPATIENT)
Dept: ORTHOPEDIC SURGERY | Facility: CLINIC | Age: 43
End: 2018-10-12

## 2018-11-01 ENCOUNTER — RX RENEWAL (OUTPATIENT)
Age: 43
End: 2018-11-01

## 2018-11-05 ENCOUNTER — APPOINTMENT (OUTPATIENT)
Dept: FAMILY MEDICINE | Facility: CLINIC | Age: 43
End: 2018-11-05

## 2018-11-29 ENCOUNTER — RX RENEWAL (OUTPATIENT)
Age: 43
End: 2018-11-29

## 2018-12-04 ENCOUNTER — APPOINTMENT (OUTPATIENT)
Dept: FAMILY MEDICINE | Facility: CLINIC | Age: 43
End: 2018-12-04

## 2018-12-11 ENCOUNTER — APPOINTMENT (OUTPATIENT)
Dept: FAMILY MEDICINE | Facility: CLINIC | Age: 43
End: 2018-12-11

## 2018-12-11 ENCOUNTER — RX RENEWAL (OUTPATIENT)
Age: 43
End: 2018-12-11

## 2018-12-21 DIAGNOSIS — S60.012A CONTUSION OF LEFT THUMB W/OUT DAMAGE TO NAIL, INITIAL ENCOUNTER: ICD-10-CM

## 2018-12-26 ENCOUNTER — FORM ENCOUNTER (OUTPATIENT)
Age: 43
End: 2018-12-26

## 2018-12-27 ENCOUNTER — APPOINTMENT (OUTPATIENT)
Dept: FAMILY MEDICINE | Facility: CLINIC | Age: 43
End: 2018-12-27
Payer: MEDICARE

## 2018-12-27 ENCOUNTER — OUTPATIENT (OUTPATIENT)
Dept: OUTPATIENT SERVICES | Facility: HOSPITAL | Age: 43
LOS: 1 days | End: 2018-12-27
Payer: MEDICARE

## 2018-12-27 ENCOUNTER — APPOINTMENT (OUTPATIENT)
Dept: RADIOLOGY | Facility: HOSPITAL | Age: 43
End: 2018-12-27

## 2018-12-27 VITALS
HEIGHT: 68 IN | SYSTOLIC BLOOD PRESSURE: 125 MMHG | BODY MASS INDEX: 44.41 KG/M2 | HEART RATE: 78 BPM | RESPIRATION RATE: 20 BRPM | DIASTOLIC BLOOD PRESSURE: 78 MMHG | WEIGHT: 293 LBS

## 2018-12-27 DIAGNOSIS — S60.012A CONTUSION OF LEFT THUMB WITHOUT DAMAGE TO NAIL, INITIAL ENCOUNTER: ICD-10-CM

## 2018-12-27 DIAGNOSIS — M51.36 OTHER INTERVERTEBRAL DISC DEGENERATION, LUMBAR REGION: ICD-10-CM

## 2018-12-27 DIAGNOSIS — Z87.09 PERSONAL HISTORY OF OTHER DISEASES OF THE RESPIRATORY SYSTEM: ICD-10-CM

## 2018-12-27 PROCEDURE — 99215 OFFICE O/P EST HI 40 MIN: CPT | Mod: 25

## 2018-12-27 PROCEDURE — 36415 COLL VENOUS BLD VENIPUNCTURE: CPT

## 2018-12-27 PROCEDURE — 73130 X-RAY EXAM OF HAND: CPT | Mod: 26,LT

## 2018-12-27 PROCEDURE — 96372 THER/PROPH/DIAG INJ SC/IM: CPT

## 2018-12-27 PROCEDURE — 73130 X-RAY EXAM OF HAND: CPT

## 2018-12-27 RX ORDER — CYANOCOBALAMIN 1000 UG/ML
1000 INJECTION INTRAMUSCULAR; SUBCUTANEOUS
Qty: 0 | Refills: 0 | Status: COMPLETED | OUTPATIENT
Start: 2018-12-27

## 2018-12-27 RX ADMIN — CYANOCOBALAMIN 0 MCG/ML: 1000 INJECTION INTRAMUSCULAR; SUBCUTANEOUS at 00:00

## 2018-12-27 NOTE — PHYSICAL EXAM
[No Acute Distress] : no acute distress [Normal Sclera/Conjunctiva] : normal sclera/conjunctiva [PERRL] : pupils equal round and reactive to light [EOMI] : extraocular movements intact [Normal Outer Ear/Nose] : the outer ears and nose were normal in appearance [Normal Nasal Mucosa] : the nasal mucosa was normal [No JVD] : no jugular venous distention [Supple] : supple [No Respiratory Distress] : no respiratory distress  [Clear to Auscultation] : lungs were clear to auscultation bilaterally [No Accessory Muscle Use] : no accessory muscle use [Normal Rate] : normal rate  [Regular Rhythm] : with a regular rhythm [Normal S1, S2] : normal S1 and S2 [No Murmur] : no murmur heard [No Edema] : there was no peripheral edema [Soft] : abdomen soft [Non Tender] : non-tender [Normal Posterior Cervical Nodes] : no posterior cervical lymphadenopathy [Normal Anterior Cervical Nodes] : no anterior cervical lymphadenopathy [Muscle Spasms, Bilateral] : bilateral muscle spasms [Motor Strength Lower Extremities Bilaterally] : there was weakness in both lower extremities [Limited Balance] : the patient's balance was impaired [Alert and Oriented x3] : oriented to person, place, and time [de-identified] : TMs and pharynx congested; pharynx mildly injected; note tender on percussion over frontals [de-identified] : using cane for ambulation

## 2018-12-27 NOTE — ASSESSMENT
[FreeTextEntry1] : Hemodynamically stable with acceptable BP\par Musculoskeletal findings consistent with history - continue judicious use of all medication\par Sinusitis - will order Augmentin and observe\par Lab profiles sent\par B12 1ml IM given R deltoid

## 2018-12-27 NOTE — HISTORY OF PRESENT ILLNESS
[de-identified] : Presents for BP check, labs, general follow-up; also due for B12 injection.  Also now states has had several days of sinus pressure, congestion; states getting worse.\par Reviewed medication - using pain mgt very appropriately and judiciously to maintain daily activities and QOL; note pt is alert, conversant, with no evidence of intoxication or euphoria.

## 2018-12-28 LAB
25(OH)D3 SERPL-MCNC: 28.2 NG/ML
ALBUMIN SERPL ELPH-MCNC: 4.6 G/DL
ALP BLD-CCNC: 78 U/L
ALT SERPL-CCNC: 19 U/L
ANION GAP SERPL CALC-SCNC: 13 MMOL/L
AST SERPL-CCNC: 13 U/L
BASOPHILS # BLD AUTO: 0.02 K/UL
BASOPHILS NFR BLD AUTO: 0.3 %
BILIRUB SERPL-MCNC: 0.2 MG/DL
BUN SERPL-MCNC: 7 MG/DL
CALCIUM SERPL-MCNC: 9.2 MG/DL
CHLORIDE SERPL-SCNC: 106 MMOL/L
CHOLEST SERPL-MCNC: 216 MG/DL
CHOLEST/HDLC SERPL: 5.4 RATIO
CO2 SERPL-SCNC: 22 MMOL/L
CREAT SERPL-MCNC: 0.7 MG/DL
EOSINOPHIL # BLD AUTO: 0.03 K/UL
EOSINOPHIL NFR BLD AUTO: 0.4 %
GLUCOSE SERPL-MCNC: 120 MG/DL
HBA1C MFR BLD HPLC: 6.4 %
HCT VFR BLD CALC: 40.5 %
HDLC SERPL-MCNC: 40 MG/DL
HGB BLD-MCNC: 12.8 G/DL
IMM GRANULOCYTES NFR BLD AUTO: 0.1 %
LDLC SERPL CALC-MCNC: 156 MG/DL
LYMPHOCYTES # BLD AUTO: 2.18 K/UL
LYMPHOCYTES NFR BLD AUTO: 29.3 %
MAN DIFF?: NORMAL
MCHC RBC-ENTMCNC: 26.4 PG
MCHC RBC-ENTMCNC: 31.6 GM/DL
MCV RBC AUTO: 83.7 FL
MONOCYTES # BLD AUTO: 0.53 K/UL
MONOCYTES NFR BLD AUTO: 7.1 %
NEUTROPHILS # BLD AUTO: 4.66 K/UL
NEUTROPHILS NFR BLD AUTO: 62.8 %
PLATELET # BLD AUTO: 371 K/UL
POTASSIUM SERPL-SCNC: 3.8 MMOL/L
PROT SERPL-MCNC: 6.9 G/DL
RBC # BLD: 4.84 M/UL
RBC # FLD: 15.5 %
SODIUM SERPL-SCNC: 141 MMOL/L
T4 FREE SERPL-MCNC: 1 NG/DL
TRIGL SERPL-MCNC: 98 MG/DL
TSH SERPL-ACNC: 1.92 UIU/ML
VIT B12 SERPL-MCNC: 295 PG/ML
WBC # FLD AUTO: 7.43 K/UL

## 2019-01-02 ENCOUNTER — RX RENEWAL (OUTPATIENT)
Age: 44
End: 2019-01-02

## 2019-01-08 ENCOUNTER — RX RENEWAL (OUTPATIENT)
Age: 44
End: 2019-01-08

## 2019-01-14 ENCOUNTER — FORM ENCOUNTER (OUTPATIENT)
Age: 44
End: 2019-01-14

## 2019-01-15 ENCOUNTER — APPOINTMENT (OUTPATIENT)
Dept: MRI IMAGING | Facility: HOSPITAL | Age: 44
End: 2019-01-15
Payer: MEDICARE

## 2019-01-15 ENCOUNTER — OUTPATIENT (OUTPATIENT)
Dept: OUTPATIENT SERVICES | Facility: HOSPITAL | Age: 44
LOS: 1 days | End: 2019-01-15
Payer: MEDICARE

## 2019-01-15 DIAGNOSIS — M12.9 ARTHROPATHY, UNSPECIFIED: ICD-10-CM

## 2019-01-15 PROCEDURE — 73721 MRI JNT OF LWR EXTRE W/O DYE: CPT

## 2019-01-15 PROCEDURE — 73721 MRI JNT OF LWR EXTRE W/O DYE: CPT | Mod: 26,LT

## 2019-01-16 ENCOUNTER — RESULT REVIEW (OUTPATIENT)
Age: 44
End: 2019-01-16

## 2019-01-24 ENCOUNTER — RX RENEWAL (OUTPATIENT)
Age: 44
End: 2019-01-24

## 2019-01-31 ENCOUNTER — RX RENEWAL (OUTPATIENT)
Age: 44
End: 2019-01-31

## 2019-02-05 ENCOUNTER — APPOINTMENT (OUTPATIENT)
Dept: FAMILY MEDICINE | Facility: CLINIC | Age: 44
End: 2019-02-05

## 2019-02-19 ENCOUNTER — RX RENEWAL (OUTPATIENT)
Age: 44
End: 2019-02-19

## 2019-03-12 ENCOUNTER — RX RENEWAL (OUTPATIENT)
Age: 44
End: 2019-03-12

## 2019-03-15 ENCOUNTER — APPOINTMENT (OUTPATIENT)
Dept: FAMILY MEDICINE | Facility: CLINIC | Age: 44
End: 2019-03-15
Payer: MEDICARE

## 2019-03-15 PROCEDURE — 96372 THER/PROPH/DIAG INJ SC/IM: CPT

## 2019-03-15 RX ORDER — CYANOCOBALAMIN 1000 UG/ML
1000 INJECTION INTRAMUSCULAR; SUBCUTANEOUS
Qty: 0 | Refills: 0 | Status: COMPLETED | OUTPATIENT
Start: 2019-03-15

## 2019-03-15 RX ADMIN — CYANOCOBALAMIN 0 MCG/ML: 1000 INJECTION INTRAMUSCULAR; SUBCUTANEOUS at 00:00

## 2019-03-19 ENCOUNTER — RX RENEWAL (OUTPATIENT)
Age: 44
End: 2019-03-19

## 2019-04-01 ENCOUNTER — OUTPATIENT (OUTPATIENT)
Dept: OUTPATIENT SERVICES | Facility: HOSPITAL | Age: 44
LOS: 1 days | End: 2019-04-01
Payer: MEDICARE

## 2019-04-01 PROCEDURE — G9001: CPT

## 2019-04-18 ENCOUNTER — RX CHANGE (OUTPATIENT)
Age: 44
End: 2019-04-18

## 2019-04-22 ENCOUNTER — APPOINTMENT (OUTPATIENT)
Dept: FAMILY MEDICINE | Facility: CLINIC | Age: 44
End: 2019-04-22
Payer: MEDICARE

## 2019-04-22 VITALS
SYSTOLIC BLOOD PRESSURE: 125 MMHG | DIASTOLIC BLOOD PRESSURE: 78 MMHG | HEIGHT: 68 IN | RESPIRATION RATE: 20 BRPM | BODY MASS INDEX: 44.41 KG/M2 | WEIGHT: 293 LBS | HEART RATE: 76 BPM

## 2019-04-22 PROCEDURE — 36415 COLL VENOUS BLD VENIPUNCTURE: CPT

## 2019-04-22 PROCEDURE — 99214 OFFICE O/P EST MOD 30 MIN: CPT | Mod: 25

## 2019-04-22 PROCEDURE — 96372 THER/PROPH/DIAG INJ SC/IM: CPT

## 2019-04-22 RX ORDER — CYANOCOBALAMIN 1000 UG/ML
1000 INJECTION INTRAMUSCULAR; SUBCUTANEOUS
Qty: 0 | Refills: 0 | Status: COMPLETED | OUTPATIENT
Start: 2019-04-22

## 2019-04-22 RX ADMIN — CYANOCOBALAMIN 0 MCG/ML: 1000 INJECTION INTRAMUSCULAR; SUBCUTANEOUS at 00:00

## 2019-04-22 NOTE — HISTORY OF PRESENT ILLNESS
[de-identified] : Presents for BP check, labs, and general follow-up.  States feeling generally well; no new breathing issues.  Trying to watch diet - note very desirable wt loss.  Again discussed smoking cessation.\par Reviewed all medication - using anxiolytic and pain mgt very judiciously and appropriately to maintain daily activities and QOL with no obvious adverse effects.\par Also note due for B12 injection.

## 2019-04-22 NOTE — ASSESSMENT
[FreeTextEntry1] : Hemodynamically stable with acceptable BP\par Asthma well controlled at present\par All chronic issues well managed with current regime\par Lab profiles sent\par B12 1ml IM given R deltoid

## 2019-04-22 NOTE — PHYSICAL EXAM
[No Acute Distress] : no acute distress [No JVD] : no jugular venous distention [No Lymphadenopathy] : no lymphadenopathy [Supple] : supple [No Respiratory Distress] : no respiratory distress  [Thyroid Normal, No Nodules] : the thyroid was normal and there were no nodules present [Clear to Auscultation] : lungs were clear to auscultation bilaterally [No Accessory Muscle Use] : no accessory muscle use [Normal Rate] : normal rate  [Regular Rhythm] : with a regular rhythm [No Murmur] : no murmur heard [Normal S1, S2] : normal S1 and S2 [Soft] : abdomen soft [No Edema] : there was no peripheral edema [Muscle Spasms, Bilateral] : bilateral muscle spasms [Non Tender] : non-tender [No Focal Deficits] : no focal deficits [Motor Strength Lower Extremities Bilaterally] : there was weakness in both lower extremities [Limited Balance] : the patient's balance was impaired [Memory Grossly Normal] : memory grossly normal [Speech Grossly Normal] : speech grossly normal [Alert and Oriented x3] : oriented to person, place, and time [Normal Affect] : the affect was normal [Normal Insight/Judgement] : insight and judgment were intact [Normal Mood] : the mood was normal [Comprehensive Foot Exam Normal] : Right and left foot were examined and both feet are normal. No ulcers in either foot. Toes are normal and with full ROM.  Normal tactile sensation with monofilament testing throughout both feet [de-identified] : using cane for ambulation

## 2019-04-22 NOTE — COUNSELING
[Weight management counseling provided] : Weight management [Healthy eating counseling provided] : healthy eating [Activity counseling provided] : activity [Discussed Risks and Advised to Quit Smoking] : Discussed risks and advised to quit smoking [None] : None [Good understanding] : Patient has a good understanding of lifestyle changes and the steps needed to achieve self management goals

## 2019-04-22 NOTE — HEALTH RISK ASSESSMENT
[0] : 2) Feeling down, depressed, or hopeless: Not at all (0) [Patient reported PAP Smear was normal] : Patient reported PAP Smear was normal [With Patient/Caregiver] : With Patient/Caregiver [] : No [PapSmearDate] : 06/18 [FreeTextEntry4] : has not designated HCP nor has decided on preferences [AdvancecareDate] : 04/19

## 2019-04-23 LAB
ALBUMIN SERPL ELPH-MCNC: 4.3 G/DL
ALP BLD-CCNC: 68 U/L
ALT SERPL-CCNC: 15 U/L
ANION GAP SERPL CALC-SCNC: 11 MMOL/L
AST SERPL-CCNC: 14 U/L
BASOPHILS # BLD AUTO: 0.03 K/UL
BASOPHILS NFR BLD AUTO: 0.5 %
BILIRUB SERPL-MCNC: 0.2 MG/DL
BUN SERPL-MCNC: 8 MG/DL
CALCIUM SERPL-MCNC: 9.1 MG/DL
CHLORIDE SERPL-SCNC: 106 MMOL/L
CHOLEST SERPL-MCNC: 235 MG/DL
CHOLEST/HDLC SERPL: 5.2 RATIO
CO2 SERPL-SCNC: 23 MMOL/L
CREAT SERPL-MCNC: 0.77 MG/DL
EOSINOPHIL # BLD AUTO: 0.07 K/UL
EOSINOPHIL NFR BLD AUTO: 1.1 %
ESTIMATED AVERAGE GLUCOSE: 128 MG/DL
FOLATE SERPL-MCNC: 6.1 NG/ML
GLUCOSE SERPL-MCNC: 103 MG/DL
HBA1C MFR BLD HPLC: 6.1 %
HCT VFR BLD CALC: 42.5 %
HDLC SERPL-MCNC: 45 MG/DL
HGB BLD-MCNC: 13.2 G/DL
IMM GRANULOCYTES NFR BLD AUTO: 0.3 %
LDLC SERPL CALC-MCNC: 165 MG/DL
LYMPHOCYTES # BLD AUTO: 2.04 K/UL
LYMPHOCYTES NFR BLD AUTO: 32 %
MAN DIFF?: NORMAL
MCHC RBC-ENTMCNC: 26.2 PG
MCHC RBC-ENTMCNC: 31.1 GM/DL
MCV RBC AUTO: 84.5 FL
MONOCYTES # BLD AUTO: 0.48 K/UL
MONOCYTES NFR BLD AUTO: 7.5 %
NEUTROPHILS # BLD AUTO: 3.73 K/UL
NEUTROPHILS NFR BLD AUTO: 58.6 %
PLATELET # BLD AUTO: 350 K/UL
POTASSIUM SERPL-SCNC: 4 MMOL/L
PROT SERPL-MCNC: 6.5 G/DL
RBC # BLD: 5.03 M/UL
RBC # FLD: 15.4 %
SODIUM SERPL-SCNC: 140 MMOL/L
TRIGL SERPL-MCNC: 127 MG/DL
VIT B12 SERPL-MCNC: 368 PG/ML
WBC # FLD AUTO: 6.37 K/UL

## 2019-04-24 ENCOUNTER — RESULT REVIEW (OUTPATIENT)
Age: 44
End: 2019-04-24

## 2019-04-24 DIAGNOSIS — Z71.89 OTHER SPECIFIED COUNSELING: ICD-10-CM

## 2019-05-14 ENCOUNTER — CLINICAL ADVICE (OUTPATIENT)
Age: 44
End: 2019-05-14

## 2019-05-14 DIAGNOSIS — Z76.89 PERSONS ENCOUNTERING HEALTH SERVICES IN OTHER SPECIFIED CIRCUMSTANCES: ICD-10-CM

## 2019-05-16 ENCOUNTER — FORM ENCOUNTER (OUTPATIENT)
Age: 44
End: 2019-05-16

## 2019-05-17 ENCOUNTER — APPOINTMENT (OUTPATIENT)
Dept: RADIOLOGY | Facility: HOSPITAL | Age: 44
End: 2019-05-17
Payer: MEDICARE

## 2019-05-17 ENCOUNTER — OUTPATIENT (OUTPATIENT)
Dept: OUTPATIENT SERVICES | Facility: HOSPITAL | Age: 44
LOS: 1 days | End: 2019-05-17
Payer: MEDICARE

## 2019-05-17 ENCOUNTER — TRANSCRIPTION ENCOUNTER (OUTPATIENT)
Age: 44
End: 2019-05-17

## 2019-05-17 DIAGNOSIS — M12.9 ARTHROPATHY, UNSPECIFIED: ICD-10-CM

## 2019-05-17 PROCEDURE — 73030 X-RAY EXAM OF SHOULDER: CPT | Mod: 26,LT

## 2019-05-17 PROCEDURE — 73000 X-RAY EXAM OF COLLAR BONE: CPT

## 2019-05-17 PROCEDURE — 73030 X-RAY EXAM OF SHOULDER: CPT

## 2019-05-17 PROCEDURE — 73000 X-RAY EXAM OF COLLAR BONE: CPT | Mod: 26,LT

## 2019-05-20 ENCOUNTER — APPOINTMENT (OUTPATIENT)
Dept: FAMILY MEDICINE | Facility: CLINIC | Age: 44
End: 2019-05-20
Payer: MEDICARE

## 2019-05-20 VITALS
RESPIRATION RATE: 20 BRPM | HEART RATE: 78 BPM | SYSTOLIC BLOOD PRESSURE: 112 MMHG | WEIGHT: 293 LBS | BODY MASS INDEX: 44.41 KG/M2 | DIASTOLIC BLOOD PRESSURE: 70 MMHG | HEIGHT: 68 IN

## 2019-05-20 PROCEDURE — 96372 THER/PROPH/DIAG INJ SC/IM: CPT

## 2019-05-20 PROCEDURE — 99214 OFFICE O/P EST MOD 30 MIN: CPT | Mod: 25

## 2019-05-20 RX ORDER — CYANOCOBALAMIN 1000 UG/ML
1000 INJECTION INTRAMUSCULAR; SUBCUTANEOUS
Qty: 0 | Refills: 0 | Status: COMPLETED | OUTPATIENT
Start: 2019-05-20

## 2019-05-20 RX ADMIN — CYANOCOBALAMIN 0 MCG/ML: 1000 INJECTION INTRAMUSCULAR; SUBCUTANEOUS at 00:00

## 2019-05-20 NOTE — HISTORY OF PRESENT ILLNESS
[de-identified] : Presents for BP check and general follow-up.  Also due for B12 injection.  States having increasing pain L shoulder - note reviewed x-ray report.  Reviewed medication - using pain mgt very appropriately and judiciously to maintain daily functioning and QOL with no obvious adverse effects.   Discussed diet - note very desirable wt loss.

## 2019-05-20 NOTE — ASSESSMENT
[FreeTextEntry1] : Hemodynamically stable with acceptable BP\par Asthma quiescent at this encounter\par Findings otherwise consistent with history with increased pain, decreased ROM L shoulder - will order MRI; continue judicious use of all medication\par B12 1ml IM given R deltoid

## 2019-05-20 NOTE — PHYSICAL EXAM
[No Acute Distress] : no acute distress [No JVD] : no jugular venous distention [Supple] : supple [No Lymphadenopathy] : no lymphadenopathy [Thyroid Normal, No Nodules] : the thyroid was normal and there were no nodules present [No Respiratory Distress] : no respiratory distress  [Clear to Auscultation] : lungs were clear to auscultation bilaterally [No Accessory Muscle Use] : no accessory muscle use [Normal Rate] : normal rate  [Regular Rhythm] : with a regular rhythm [Normal S1, S2] : normal S1 and S2 [No Murmur] : no murmur heard [No Edema] : there was no peripheral edema [Soft] : abdomen soft [Non Tender] : non-tender [Motor Strength Upper Extremities Bilaterally] : there was weakness in both upper extremities [Motor Strength Lower Extremities Bilaterally] : there was weakness in both lower extremities [No Focal Deficits] : no focal deficits [Speech Grossly Normal] : speech grossly normal [Memory Grossly Normal] : memory grossly normal [Normal Affect] : the affect was normal [Alert and Oriented x3] : oriented to person, place, and time [Normal Mood] : the mood was normal [Normal Insight/Judgement] : insight and judgment were intact [de-identified] : general joint deformities consistent with DJD; note marked decreased ROM L shoulder with obvious discomfort on movement

## 2019-06-07 ENCOUNTER — APPOINTMENT (OUTPATIENT)
Dept: FAMILY MEDICINE | Facility: CLINIC | Age: 44
End: 2019-06-07
Payer: MEDICARE

## 2019-06-07 VITALS
SYSTOLIC BLOOD PRESSURE: 150 MMHG | DIASTOLIC BLOOD PRESSURE: 90 MMHG | TEMPERATURE: 98.9 F | WEIGHT: 293 LBS | HEART RATE: 83 BPM | OXYGEN SATURATION: 97 % | RESPIRATION RATE: 15 BRPM | HEIGHT: 68 IN | BODY MASS INDEX: 44.41 KG/M2

## 2019-06-07 DIAGNOSIS — N63.32 UNSPECIFIED LUMP IN AXILLARY TAIL OF THE LEFT BREAST: ICD-10-CM

## 2019-06-07 DIAGNOSIS — F31.9 BIPOLAR DISORDER, UNSPECIFIED: ICD-10-CM

## 2019-06-07 PROCEDURE — 99214 OFFICE O/P EST MOD 30 MIN: CPT

## 2019-06-07 RX ORDER — CYCLOBENZAPRINE HYDROCHLORIDE 10 MG/1
10 TABLET, FILM COATED ORAL
Qty: 30 | Refills: 3 | Status: DISCONTINUED | COMMUNITY
Start: 2018-01-20 | End: 2019-06-07

## 2019-06-07 RX ORDER — SENNA 8.6 MG/1
8.6 TABLET, COATED ORAL
Qty: 30 | Refills: 0 | Status: DISCONTINUED | COMMUNITY
Start: 2016-11-09 | End: 2019-06-07

## 2019-06-07 RX ORDER — IRBESARTAN 150 MG/1
150 TABLET ORAL
Qty: 30 | Refills: 3 | Status: DISCONTINUED | COMMUNITY
Start: 2019-04-18 | End: 2019-06-07

## 2019-06-07 RX ORDER — CETIRIZINE HYDROCHLORIDE 10 MG/1
10 TABLET, FILM COATED ORAL DAILY
Qty: 30 | Refills: 3 | Status: DISCONTINUED | COMMUNITY
Start: 2017-07-24 | End: 2019-06-07

## 2019-06-07 RX ORDER — METHOCARBAMOL 750 MG/1
750 TABLET, FILM COATED ORAL AT BEDTIME
Qty: 30 | Refills: 3 | Status: DISCONTINUED | COMMUNITY
Start: 2018-01-19 | End: 2019-06-07

## 2019-06-07 RX ORDER — TOBRAMYCIN AND DEXAMETHASONE 3; 1 MG/ML; MG/ML
0.3-0.1 SUSPENSION/ DROPS OPHTHALMIC
Qty: 5 | Refills: 0 | Status: DISCONTINUED | COMMUNITY
Start: 2017-11-14 | End: 2019-06-07

## 2019-06-07 RX ORDER — OXYCODONE 10 MG/1
10 TABLET ORAL EVERY 4 HOURS
Qty: 150 | Refills: 0 | Status: DISCONTINUED | COMMUNITY
Start: 2017-04-03 | End: 2019-06-07

## 2019-06-07 RX ORDER — TIZANIDINE HYDROCHLORIDE 4 MG/1
4 CAPSULE ORAL 3 TIMES DAILY
Qty: 30 | Refills: 3 | Status: DISCONTINUED | COMMUNITY
Start: 2019-02-21 | End: 2019-06-07

## 2019-06-07 RX ORDER — NEOMYCIN AND POLYMYXIN B SULFATES AND DEXAMETHASONE 3.5; 10000; 1 MG/G; [IU]/G; MG/G
3.5-10000-0.1 OINTMENT OPHTHALMIC
Qty: 3 | Refills: 0 | Status: DISCONTINUED | COMMUNITY
Start: 2017-11-14 | End: 2019-06-07

## 2019-06-07 RX ORDER — AMOXICILLIN AND CLAVULANATE POTASSIUM 875; 125 MG/1; MG/1
875-125 TABLET, COATED ORAL
Qty: 20 | Refills: 0 | Status: DISCONTINUED | COMMUNITY
Start: 2017-05-11 | End: 2019-06-07

## 2019-06-07 RX ORDER — CIPROFLOXACIN HYDROCHLORIDE 250 MG/1
250 TABLET, FILM COATED ORAL
Qty: 14 | Refills: 0 | Status: DISCONTINUED | COMMUNITY
Start: 2017-10-06 | End: 2019-06-07
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
## 2019-06-07 NOTE — PHYSICAL EXAM
[No Acute Distress] : no acute distress [EOMI] : extraocular movements intact [PERRL] : pupils equal round and reactive to light [Well Nourished] : well nourished [Normal Oropharynx] : the oropharynx was normal [Supple] : supple [Normal S1, S2] : normal S1 and S2 [No Respiratory Distress] : no respiratory distress  [No Accessory Muscle Use] : no accessory muscle use [Soft] : abdomen soft [No Edema] : there was no peripheral edema [Non Tender] : non-tender [No Spinal Tenderness] : no spinal tenderness [No Joint Swelling] : no joint swelling [No Rash] : no rash [Normal Affect] : the affect was normal [de-identified] : obese [de-identified] : left axillary lump [de-identified] : left axillary lump, well circumcribed, about 1.5 inch in diameter, movable, and tender on palpation [de-identified] : use cane for ambulation

## 2019-06-07 NOTE — HEALTH RISK ASSESSMENT
[30 or more] : 30 or more [One fall no injury in past year] : Patient reported one fall in the past year without injury [0] : 2) Feeling down, depressed, or hopeless: Not at all (0) [] : No [de-identified] : 1 pack a week  [de-identified] : rarely wine/beer [de-identified] : mechanical fall [[id number]] : 0

## 2019-06-10 ENCOUNTER — APPOINTMENT (OUTPATIENT)
Dept: MRI IMAGING | Facility: HOSPITAL | Age: 44
End: 2019-06-10

## 2019-06-11 ENCOUNTER — FORM ENCOUNTER (OUTPATIENT)
Age: 44
End: 2019-06-11

## 2019-06-12 ENCOUNTER — OUTPATIENT (OUTPATIENT)
Dept: OUTPATIENT SERVICES | Facility: HOSPITAL | Age: 44
LOS: 1 days | End: 2019-06-12
Payer: MEDICARE

## 2019-06-12 ENCOUNTER — APPOINTMENT (OUTPATIENT)
Dept: ULTRASOUND IMAGING | Facility: HOSPITAL | Age: 44
End: 2019-06-12

## 2019-06-12 ENCOUNTER — APPOINTMENT (OUTPATIENT)
Dept: MAMMOGRAPHY | Facility: HOSPITAL | Age: 44
End: 2019-06-12

## 2019-06-12 DIAGNOSIS — N63.32 UNSPECIFIED LUMP IN AXILLARY TAIL OF THE LEFT BREAST: ICD-10-CM

## 2019-06-12 PROCEDURE — G0279: CPT

## 2019-06-12 PROCEDURE — 76641 ULTRASOUND BREAST COMPLETE: CPT

## 2019-06-12 PROCEDURE — 77065 DX MAMMO INCL CAD UNI: CPT

## 2019-06-14 ENCOUNTER — FORM ENCOUNTER (OUTPATIENT)
Age: 44
End: 2019-06-14

## 2019-06-15 ENCOUNTER — APPOINTMENT (OUTPATIENT)
Dept: MRI IMAGING | Facility: CLINIC | Age: 44
End: 2019-06-15
Payer: MEDICARE

## 2019-06-15 ENCOUNTER — OUTPATIENT (OUTPATIENT)
Dept: OUTPATIENT SERVICES | Facility: HOSPITAL | Age: 44
LOS: 1 days | End: 2019-06-15
Payer: MEDICARE

## 2019-06-15 DIAGNOSIS — M12.9 ARTHROPATHY, UNSPECIFIED: ICD-10-CM

## 2019-06-15 PROCEDURE — 73221 MRI JOINT UPR EXTREM W/O DYE: CPT

## 2019-06-15 PROCEDURE — 73221 MRI JOINT UPR EXTREM W/O DYE: CPT | Mod: 26,LT

## 2019-06-17 ENCOUNTER — RX CHANGE (OUTPATIENT)
Age: 44
End: 2019-06-17

## 2019-06-19 ENCOUNTER — RESULT REVIEW (OUTPATIENT)
Age: 44
End: 2019-06-19

## 2019-06-19 DIAGNOSIS — L08.9 LOCAL INFECTION OF THE SKIN AND SUBCUTANEOUS TISSUE, UNSPECIFIED: ICD-10-CM

## 2019-06-19 RX ORDER — MUPIROCIN 20 MG/G
2 OINTMENT TOPICAL 3 TIMES DAILY
Qty: 1 | Refills: 3 | Status: ACTIVE | COMMUNITY
Start: 2019-06-19 | End: 1900-01-01

## 2019-06-20 ENCOUNTER — APPOINTMENT (OUTPATIENT)
Dept: FAMILY MEDICINE | Facility: CLINIC | Age: 44
End: 2019-06-20
Payer: MEDICARE

## 2019-06-20 VITALS — WEIGHT: 293 LBS | BODY MASS INDEX: 47.29 KG/M2

## 2019-06-20 PROCEDURE — 96372 THER/PROPH/DIAG INJ SC/IM: CPT

## 2019-06-20 RX ORDER — CYANOCOBALAMIN 1000 UG/ML
1000 INJECTION INTRAMUSCULAR; SUBCUTANEOUS
Qty: 0 | Refills: 0 | Status: COMPLETED | OUTPATIENT
Start: 2019-06-20

## 2019-06-20 RX ADMIN — CYANOCOBALAMIN 0 MCG/ML: 1000 INJECTION INTRAMUSCULAR; SUBCUTANEOUS at 00:00

## 2019-06-25 ENCOUNTER — NON-APPOINTMENT (OUTPATIENT)
Age: 44
End: 2019-06-25

## 2019-06-25 ENCOUNTER — APPOINTMENT (OUTPATIENT)
Dept: CARDIOLOGY | Facility: CLINIC | Age: 44
End: 2019-06-25
Payer: MEDICARE

## 2019-06-25 VITALS
DIASTOLIC BLOOD PRESSURE: 85 MMHG | HEART RATE: 91 BPM | WEIGHT: 293 LBS | RESPIRATION RATE: 17 BRPM | SYSTOLIC BLOOD PRESSURE: 127 MMHG | BODY MASS INDEX: 44.41 KG/M2 | HEIGHT: 68 IN | OXYGEN SATURATION: 97 %

## 2019-06-25 DIAGNOSIS — E66.9 OBESITY, UNSPECIFIED: ICD-10-CM

## 2019-06-25 DIAGNOSIS — F17.200 NICOTINE DEPENDENCE, UNSPECIFIED, UNCOMPLICATED: ICD-10-CM

## 2019-06-25 PROCEDURE — 99204 OFFICE O/P NEW MOD 45 MIN: CPT

## 2019-06-25 PROCEDURE — 93000 ELECTROCARDIOGRAM COMPLETE: CPT

## 2019-07-09 ENCOUNTER — APPOINTMENT (OUTPATIENT)
Dept: NEUROLOGY | Facility: CLINIC | Age: 44
End: 2019-07-09
Payer: MEDICARE

## 2019-07-09 VITALS
SYSTOLIC BLOOD PRESSURE: 110 MMHG | OXYGEN SATURATION: 98 % | BODY MASS INDEX: 44.41 KG/M2 | HEIGHT: 68 IN | TEMPERATURE: 98.3 F | HEART RATE: 70 BPM | RESPIRATION RATE: 17 BRPM | DIASTOLIC BLOOD PRESSURE: 60 MMHG | WEIGHT: 293 LBS

## 2019-07-09 DIAGNOSIS — R93.0 ABNORMAL FINDINGS ON DIAGNOSTIC IMAGING OF SKULL AND HEAD, NOT ELSEWHERE CLASSIFIED: ICD-10-CM

## 2019-07-09 DIAGNOSIS — R29.898 OTHER SYMPTOMS AND SIGNS INVOLVING THE MUSCULOSKELETAL SYSTEM: ICD-10-CM

## 2019-07-09 PROCEDURE — 99204 OFFICE O/P NEW MOD 45 MIN: CPT

## 2019-07-09 NOTE — HISTORY OF PRESENT ILLNESS
[FreeTextEntry1] : 44 W who is here for initial consultation for MRI of brain that was ordered by Dr. Whitlock in 2016 but she never followed through because she was afraid. he was evaluating her for possible demyelinating disease given the wmid changes. I think it is likely due to her htn and hld. She states her sister urged her to followup.\par \par She has history of sudden onset of right face and first 2 finger weakness in 2015. MRi at the time was neg for infarct. She states the symptoms have improved but she still has residual weakness of the right thumb and index finger. her ldl is 160's and she has no history of gi bleed. She was agreeable to starting asa 81mg and lipitor 80mg. She has vascular risk factors for stroke. \par \par She also has b12 deficiency but even after b12 injections, her most recent b12 level is still low normal. Review of labs was performed. No hx of gi surgery.

## 2019-07-09 NOTE — DISCUSSION/SUMMARY
[FreeTextEntry1] : 44 W who has vascular risk factors is here for initial consultation.\par 1. She wishes to finish the workup that Dr. Whitlock recommended. Will send her for mri of brain w/ gado. Notified by radiology dept at 10 med to enter this as w and w/o gado for evaluation of demyelinating disease.\par 2. her right facial droop and right finger weakness sounds like an infarct. It's possible it was mri neg. Will start asa 81mg and lipitor 80mg for stroke prevention. \par 3. B12 deficiency: no history of surgery. Will check other blood work to evaluate for pernicious anemia as a cause of her low b12 levels despite injection. \par \par Patient was advised to continue to monitor for neurologic symptoms and to notify my office or go to the nearest emergency room if there are any changes. Neurologic issues were discussed with the patient. All questions were answered to complete satisfaction. Education was provided to the patient during this encounter.\par Side effects of the above medications were discussed in detail including but not limited to applicable black box warning and teratogenicity as appropriate. \par Patient was advised to bring previous records to my office. \par \par \par

## 2019-07-09 NOTE — PHYSICAL EXAM
[General Appearance - Alert] : alert [Oriented To Time, Place, And Person] : oriented to person, place, and time [Person] : oriented to person [Place] : oriented to place [Time] : oriented to time [Short Term Intact] : short term memory intact [Span Intact] : the attention span was normal [Naming Objects] : no difficulty naming common objects [Fluency] : fluency intact [Current Events] : adequate knowledge of current events [Cranial Nerves Optic (II)] : visual acuity intact bilaterally,  visual fields full to confrontation, pupils equal round and reactive to light [Cranial Nerves Oculomotor (III)] : extraocular motion intact [Cranial Nerves Trigeminal (V)] : facial sensation intact symmetrically [Cranial Nerves Facial (VII)] : face symmetrical [Cranial Nerves Vestibulocochlear (VIII)] : hearing was intact bilaterally [Cranial Nerves Glossopharyngeal (IX)] : tongue and palate midline [Cranial Nerves Accessory (XI - Cranial And Spinal)] : head turning and shoulder shrug symmetric [Cranial Nerves Hypoglossal (XII)] : there was no tongue deviation with protrusion [Motor Tone] : muscle tone was normal in all four extremities [Sensation Tactile Decrease] : light touch was intact [Sensation Pain / Temperature Decrease] : pain and temperature was intact [Coordination - Dysmetria Impaired Finger-to-Nose Bilateral] : not present [1+] : Biceps left 1+ [FreeTextEntry6] : decreased right nlf. weakness in making ok sign with right fingers.  [FreeTextEntry8] : walks with cane on right side. [Extraocular Movements] : extraocular movements were intact [Optic Disc Abnormality] : the optic disc were normal in size and color [Hearing Threshold Finger Rub Not Fountain] : hearing was normal [Full Pulse] : the pedal pulses are present [] : no rash [FreeTextEntry1] : see above

## 2019-07-09 NOTE — CONSULT LETTER
[Dear  ___] : Dear  [unfilled], [FreeTextEntry1] : \par \par Thank you very much for allowing me to participate in the care of your patient. Please don't hesitate to contact me with any questions or concerns. \par \par Sincerely,\par Lucian Hinton MD\par Neurology\par [facility]\par [address]\[place] [address]\par Tel: [phone]\par Email: [email or web address]\par \par

## 2019-07-10 ENCOUNTER — FORM ENCOUNTER (OUTPATIENT)
Age: 44
End: 2019-07-10

## 2019-07-11 ENCOUNTER — OUTPATIENT (OUTPATIENT)
Dept: OUTPATIENT SERVICES | Facility: HOSPITAL | Age: 44
LOS: 1 days | End: 2019-07-11
Payer: MEDICARE

## 2019-07-11 ENCOUNTER — APPOINTMENT (OUTPATIENT)
Dept: MRI IMAGING | Facility: HOSPITAL | Age: 44
End: 2019-07-11
Payer: MEDICARE

## 2019-07-11 DIAGNOSIS — R93.0 ABNORMAL FINDINGS ON DIAGNOSTIC IMAGING OF SKULL AND HEAD, NOT ELSEWHERE CLASSIFIED: ICD-10-CM

## 2019-07-11 DIAGNOSIS — R29.898 OTHER SYMPTOMS AND SIGNS INVOLVING THE MUSCULOSKELETAL SYSTEM: ICD-10-CM

## 2019-07-11 LAB
IF BLOCK AB SER QL: NORMAL
PCA AB SER QL IF: NORMAL

## 2019-07-11 PROCEDURE — A9579: CPT

## 2019-07-11 PROCEDURE — 70553 MRI BRAIN STEM W/O & W/DYE: CPT | Mod: 26

## 2019-07-11 PROCEDURE — 70553 MRI BRAIN STEM W/O & W/DYE: CPT

## 2019-07-11 PROCEDURE — A9585: CPT

## 2019-07-12 ENCOUNTER — APPOINTMENT (OUTPATIENT)
Dept: CARDIOLOGY | Facility: CLINIC | Age: 44
End: 2019-07-12
Payer: MEDICARE

## 2019-07-12 LAB
HOMOCYSTEINE LEVEL: 14.1 UMOL/L
METHYLMALONIC ACID LEVEL: 103 NMOL/L

## 2019-07-12 PROCEDURE — 93306 TTE W/DOPPLER COMPLETE: CPT

## 2019-07-15 ENCOUNTER — CLINICAL ADVICE (OUTPATIENT)
Age: 44
End: 2019-07-15

## 2019-07-15 ENCOUNTER — RX RENEWAL (OUTPATIENT)
Age: 44
End: 2019-07-15

## 2019-07-16 ENCOUNTER — RX RENEWAL (OUTPATIENT)
Age: 44
End: 2019-07-16

## 2019-07-25 ENCOUNTER — APPOINTMENT (OUTPATIENT)
Dept: NEUROLOGY | Facility: CLINIC | Age: 44
End: 2019-07-25
Payer: MEDICARE

## 2019-07-25 VITALS
BODY MASS INDEX: 44.41 KG/M2 | DIASTOLIC BLOOD PRESSURE: 92 MMHG | SYSTOLIC BLOOD PRESSURE: 145 MMHG | HEIGHT: 68 IN | WEIGHT: 293 LBS | HEART RATE: 76 BPM

## 2019-07-25 DIAGNOSIS — G47.33 OBSTRUCTIVE SLEEP APNEA (ADULT) (PEDIATRIC): ICD-10-CM

## 2019-07-25 DIAGNOSIS — R06.83 SNORING: ICD-10-CM

## 2019-07-25 PROCEDURE — 99214 OFFICE O/P EST MOD 30 MIN: CPT

## 2019-07-25 NOTE — DISCUSSION/SUMMARY
[Antithrombotic therapy with ___] : antithrombotic therapy with  [unfilled] [Intensive Blood Pressure Control] : intensive blood pressure control [Lipid Lowering Therapy] : lipid lowering therapy [Patient encouraged to discuss with Primary MD] : I encouraged the patient to discuss these important issues with ~his/her~ primary care doctor [Goals and Counseling] : I have reviewed the goals of stroke risk factor modification. I counseled the patient on measures to reduce stroke risk, including the importance of medication compliance, risk factor control, exercise, healthy diet and avoidance of smoking. I reviewed stroke warning signs and symptoms and appropriate actions to take if such occur. [FreeTextEntry1] : Impression: \par LEFT posterior frontal and parietal lobes and several scattered punctate foci of ischemia in the \par BILATERAL corona radiata and LEFT centrum semiovale. etiology likely small vessel disease vs embolism from a proximal source such as cardioembolic \par \par - MRA head without and neck with and without to evaluate cerebral vasculature \par - Continue ASA 81 mg once daily for secondary stroke prevention\par - Continue to monitor BP at home and notify PCP/Cardiology for readings >140/90. Advised to maintain a log of BP readings. Educated on medication compliance and BP monitoring to prevent secondary stroke and systemic problems \par - Continue Occupational therapy as directed. \par - Continue statin therapy for secondary stroke prevention.   Further dose titration and management deferred to PCP/cardiology. Goal LDL <70 \par - Follow up with PCP for statin management and primary care needs such as regular blood work including monitoring of HbA1c (6.1) and cholesterol profile (goal LDL <70), to modify vascular risk factors \par - Follow up with cardiology for evaluation and management of ILR vs 30 day cardiac monitor to screen for occult cardiac arrhythmias like atrial fibrillation which could be the cardiac source of embolism. Possibility of starting therapeutic anticoagulation for secondary stroke prevention was also discussed with patient in detail, if they were to be diagnosed with A. Fib in the future. \par - Home sleep study referral given to patient to assess for sleep apnea as a vascular risk factor\par - Discussed traveling and the importance of hydration, frequent ambulation and medication compliance with positive understanding \par - Follow up in 6-8 weeks with first available Neurovascular attending\par - Carotid Doppler's and TCD to be obtain with next visit \par - Smoking cessation program offered to patient but refused at this time. Patient states she is in the process of quitting smoking \par \par Above mentioned plan was discussed with patient in detail. I have answered all the patients questions to the best of my abilities. I have reviewed measures for secondary stroke prevention with the patient and available family members, including aggressive vascular risk factors modification, healthy diet, regular exercise and medication compliance. As well as discussed the need for calling 911 immediately in case of any symptoms concerning for stroke in the future. \par \par Also advised to contact me upon completion of imaging studies and/or laboratory testing/investigations to discuss the results over the phone

## 2019-07-25 NOTE — HISTORY OF PRESENT ILLNESS
[FreeTextEntry1] : Ms. Kim is a is 44 year old female PMHx HTN, HLD, DM, asthma, bipolar disorder, current smoker who presents today for consultation of abnormal MRI findings. She was referred by Dr. Hinton \par \par HPI (from Dr. Hinton's note) \par 44 W who is here for initial consultation for MRI of brain that was ordered by Dr. Whitlock in 2016 but she never followed through because she was afraid. He was evaluating her for possible demyelinating disease given the amid changes but reportedly negative. I think it is likely due to her htn and hld. She states her sister urged her to followup.\par She has history of sudden onset of right face and first 2 finger weakness in 2015. MRi at the time was neg for infarct. She states the symptoms have improved but she still has residual weakness of the right thumb and index finger. her ldl is 160's and she has no history of gi bleed. She was agreeable to starting asa 81mg and lipitor 80mg. She has vascular risk factors for stroke. \par She also has b12 deficiency but even after b12 injections, her most recent b12 level is still low normal. Review of labs was performed. No hx of gi surgery. \par \par Workup:\par MRI Head 7.11.19\par Interval development of encephalomalacia and gliosis in the \par LEFT posterior frontal and parietal lobes, likely the sequela of old \par infarctions. Several scattered punctate foci of ischemia are noted in the \par BILATERAL corona radiata and LEFT centrum semiovale. \par \par \par Today she denies any new or worsening neurological signs or symptoms of stroke, TIA, and/or bleeding. She is reportedly compliant with her medication regimen and denies any adverse reactions. Denies any history of DVT, PE. Since 2015 she has had right thumb and index weakness. In 2015 she states she developed slurred speech and a right facial droop that has since resolved. She remains with the right thumb and index finger weakness. She has been ambulating with a cane for the last 7 years due to back issues. She is currently on ASA and Lipitor since she heard about MRI results

## 2019-08-01 ENCOUNTER — APPOINTMENT (OUTPATIENT)
Dept: CARDIOLOGY | Facility: CLINIC | Age: 44
End: 2019-08-01
Payer: MEDICARE

## 2019-08-01 PROCEDURE — 93228 REMOTE 30 DAY ECG REV/REPORT: CPT

## 2019-08-06 ENCOUNTER — FORM ENCOUNTER (OUTPATIENT)
Age: 44
End: 2019-08-06

## 2019-08-07 ENCOUNTER — APPOINTMENT (OUTPATIENT)
Dept: MRI IMAGING | Facility: CLINIC | Age: 44
End: 2019-08-07
Payer: MEDICARE

## 2019-08-07 ENCOUNTER — OUTPATIENT (OUTPATIENT)
Dept: OUTPATIENT SERVICES | Facility: HOSPITAL | Age: 44
LOS: 1 days | End: 2019-08-07
Payer: MEDICARE

## 2019-08-07 DIAGNOSIS — I63.9 CEREBRAL INFARCTION, UNSPECIFIED: ICD-10-CM

## 2019-08-07 PROCEDURE — 70549 MR ANGIOGRAPH NECK W/O&W/DYE: CPT | Mod: 26

## 2019-08-07 PROCEDURE — A9585: CPT

## 2019-08-07 PROCEDURE — 70544 MR ANGIOGRAPHY HEAD W/O DYE: CPT

## 2019-08-07 PROCEDURE — 70544 MR ANGIOGRAPHY HEAD W/O DYE: CPT | Mod: 26

## 2019-08-07 PROCEDURE — 70549 MR ANGIOGRAPH NECK W/O&W/DYE: CPT

## 2019-08-08 ENCOUNTER — OTHER (OUTPATIENT)
Age: 44
End: 2019-08-08

## 2019-08-12 ENCOUNTER — RX RENEWAL (OUTPATIENT)
Age: 44
End: 2019-08-12

## 2019-08-16 ENCOUNTER — APPOINTMENT (OUTPATIENT)
Dept: NEUROLOGY | Facility: CLINIC | Age: 44
End: 2019-08-16
Payer: MEDICARE

## 2019-08-16 PROCEDURE — 95806 SLEEP STUDY UNATT&RESP EFFT: CPT

## 2019-08-17 ENCOUNTER — APPOINTMENT (OUTPATIENT)
Dept: FAMILY MEDICINE | Facility: CLINIC | Age: 44
End: 2019-08-17
Payer: MEDICARE

## 2019-08-17 VITALS
HEIGHT: 68 IN | HEART RATE: 76 BPM | BODY MASS INDEX: 44.41 KG/M2 | DIASTOLIC BLOOD PRESSURE: 85 MMHG | SYSTOLIC BLOOD PRESSURE: 128 MMHG | WEIGHT: 293 LBS | RESPIRATION RATE: 20 BRPM

## 2019-08-17 DIAGNOSIS — M48.07 SPINAL STENOSIS, LUMBOSACRAL REGION: ICD-10-CM

## 2019-08-17 DIAGNOSIS — E55.9 VITAMIN D DEFICIENCY, UNSPECIFIED: ICD-10-CM

## 2019-08-17 PROCEDURE — 99214 OFFICE O/P EST MOD 30 MIN: CPT | Mod: 25

## 2019-08-17 PROCEDURE — 36415 COLL VENOUS BLD VENIPUNCTURE: CPT

## 2019-08-17 PROCEDURE — 96372 THER/PROPH/DIAG INJ SC/IM: CPT

## 2019-08-17 RX ORDER — CYANOCOBALAMIN 1000 UG/ML
1000 INJECTION INTRAMUSCULAR; SUBCUTANEOUS
Qty: 0 | Refills: 0 | Status: COMPLETED | OUTPATIENT
Start: 2019-08-17

## 2019-08-17 RX ADMIN — CYANOCOBALAMIN 0 MCG/ML: 1000 INJECTION INTRAMUSCULAR; SUBCUTANEOUS at 00:00

## 2019-08-17 NOTE — PHYSICAL EXAM
[No Acute Distress] : no acute distress [Normal] : normal rate, regular rhythm, normal S1 and S2 and no murmur heard [No Edema] : there was no peripheral edema [Soft] : abdomen soft [Non Tender] : non-tender [Motor Strength Lower Extremities Bilaterally] : there was weakness in both lower extremities [Limited Balance] : the patient's balance was impaired [Speech Grossly Normal] : speech grossly normal [Normal Affect] : the affect was normal [Alert and Oriented x3] : oriented to person, place, and time [Normal Mood] : the mood was normal [Normal Insight/Judgement] : insight and judgment were intact [de-identified] : using cane for ambulation

## 2019-08-17 NOTE — HISTORY OF PRESENT ILLNESS
[de-identified] : Presents for BP check, labs, and general follow-up.  Also due for B12 injection.  States feeling generally well; reviewed medication - using pain mgt very appropriately to maintain daily functioning and QOL; note pt is alert, conversant, with no evidence of intoxication or euphoria.  Trying to watch diet - note desirable wt loss since last visit.  Self-checks "on occasion" - states runs in low 100s.

## 2019-08-17 NOTE — ASSESSMENT
[FreeTextEntry1] : Hemodynamically stable with acceptable BP\par Neuromuscular findings consistent with history\par Lab profiles sent\par B12 1ml IM given R deltoid

## 2019-08-19 LAB
25(OH)D3 SERPL-MCNC: 37.9 NG/ML
ALBUMIN SERPL ELPH-MCNC: 4.6 G/DL
ALP BLD-CCNC: 87 U/L
ALT SERPL-CCNC: 15 U/L
ANION GAP SERPL CALC-SCNC: 14 MMOL/L
AST SERPL-CCNC: 15 U/L
BASOPHILS # BLD AUTO: 0.05 K/UL
BASOPHILS NFR BLD AUTO: 0.8 %
BILIRUB SERPL-MCNC: 0.4 MG/DL
BUN SERPL-MCNC: 6 MG/DL
CALCIUM SERPL-MCNC: 9.1 MG/DL
CHLORIDE SERPL-SCNC: 109 MMOL/L
CHOLEST SERPL-MCNC: 135 MG/DL
CHOLEST/HDLC SERPL: 3.3 RATIO
CO2 SERPL-SCNC: 21 MMOL/L
CREAT SERPL-MCNC: 0.91 MG/DL
EOSINOPHIL # BLD AUTO: 0.07 K/UL
EOSINOPHIL NFR BLD AUTO: 1.1 %
ESTIMATED AVERAGE GLUCOSE: 120 MG/DL
GLUCOSE SERPL-MCNC: 93 MG/DL
HBA1C MFR BLD HPLC: 5.8 %
HCT VFR BLD CALC: 42 %
HDLC SERPL-MCNC: 41 MG/DL
HGB BLD-MCNC: 13 G/DL
HIV1+2 AB SPEC QL IA.RAPID: NONREACTIVE
IMM GRANULOCYTES NFR BLD AUTO: 0.2 %
LDLC SERPL CALC-MCNC: 74 MG/DL
LYMPHOCYTES # BLD AUTO: 2.1 K/UL
LYMPHOCYTES NFR BLD AUTO: 32.2 %
MAN DIFF?: NORMAL
MCHC RBC-ENTMCNC: 27 PG
MCHC RBC-ENTMCNC: 31 GM/DL
MCV RBC AUTO: 87.1 FL
MONOCYTES # BLD AUTO: 0.5 K/UL
MONOCYTES NFR BLD AUTO: 7.7 %
NEUTROPHILS # BLD AUTO: 3.79 K/UL
NEUTROPHILS NFR BLD AUTO: 58 %
PLATELET # BLD AUTO: 290 K/UL
POTASSIUM SERPL-SCNC: 3.6 MMOL/L
PROT SERPL-MCNC: 6.7 G/DL
RBC # BLD: 4.82 M/UL
RBC # FLD: 15.2 %
SODIUM SERPL-SCNC: 144 MMOL/L
T4 FREE SERPL-MCNC: 0.9 NG/DL
TRIGL SERPL-MCNC: 102 MG/DL
TSH SERPL-ACNC: 1.96 UIU/ML
VIT B12 SERPL-MCNC: 387 PG/ML
WBC # FLD AUTO: 6.52 K/UL

## 2019-08-20 ENCOUNTER — OTHER (OUTPATIENT)
Age: 44
End: 2019-08-20

## 2019-08-26 ENCOUNTER — NON-APPOINTMENT (OUTPATIENT)
Age: 44
End: 2019-08-26

## 2019-08-26 ENCOUNTER — APPOINTMENT (OUTPATIENT)
Dept: CARDIOLOGY | Facility: CLINIC | Age: 44
End: 2019-08-26
Payer: MEDICARE

## 2019-08-26 ENCOUNTER — OTHER (OUTPATIENT)
Age: 44
End: 2019-08-26

## 2019-08-26 VITALS
HEIGHT: 68 IN | BODY MASS INDEX: 44.41 KG/M2 | OXYGEN SATURATION: 96 % | WEIGHT: 293 LBS | DIASTOLIC BLOOD PRESSURE: 88 MMHG | RESPIRATION RATE: 18 BRPM | HEART RATE: 92 BPM | SYSTOLIC BLOOD PRESSURE: 134 MMHG

## 2019-08-26 DIAGNOSIS — I63.9 CEREBRAL INFARCTION, UNSPECIFIED: ICD-10-CM

## 2019-08-26 DIAGNOSIS — R94.31 ABNORMAL ELECTROCARDIOGRAM [ECG] [EKG]: ICD-10-CM

## 2019-08-26 PROCEDURE — 99215 OFFICE O/P EST HI 40 MIN: CPT

## 2019-08-26 PROCEDURE — 93000 ELECTROCARDIOGRAM COMPLETE: CPT

## 2019-08-29 ENCOUNTER — OTHER (OUTPATIENT)
Age: 44
End: 2019-08-29

## 2019-08-29 ENCOUNTER — MOBILE ON CALL (OUTPATIENT)
Age: 44
End: 2019-08-29

## 2019-09-12 ENCOUNTER — RX RENEWAL (OUTPATIENT)
Age: 44
End: 2019-09-12

## 2019-09-16 ENCOUNTER — APPOINTMENT (OUTPATIENT)
Dept: FAMILY MEDICINE | Facility: CLINIC | Age: 44
End: 2019-09-16

## 2019-09-23 ENCOUNTER — APPOINTMENT (OUTPATIENT)
Dept: RADIOLOGY | Facility: HOSPITAL | Age: 44
End: 2019-09-23
Payer: COMMERCIAL

## 2019-09-23 ENCOUNTER — OUTPATIENT (OUTPATIENT)
Dept: OUTPATIENT SERVICES | Facility: HOSPITAL | Age: 44
LOS: 1 days | End: 2019-09-23
Payer: COMMERCIAL

## 2019-09-23 DIAGNOSIS — Z00.8 ENCOUNTER FOR OTHER GENERAL EXAMINATION: ICD-10-CM

## 2019-09-23 PROCEDURE — 72070 X-RAY EXAM THORAC SPINE 2VWS: CPT | Mod: 26

## 2019-09-23 PROCEDURE — 72040 X-RAY EXAM NECK SPINE 2-3 VW: CPT | Mod: 26

## 2019-09-23 PROCEDURE — 72190 X-RAY EXAM OF PELVIS: CPT | Mod: 26

## 2019-09-23 PROCEDURE — 72190 X-RAY EXAM OF PELVIS: CPT

## 2019-09-23 PROCEDURE — 72100 X-RAY EXAM L-S SPINE 2/3 VWS: CPT | Mod: 26

## 2019-09-23 PROCEDURE — 72070 X-RAY EXAM THORAC SPINE 2VWS: CPT

## 2019-09-23 PROCEDURE — 72100 X-RAY EXAM L-S SPINE 2/3 VWS: CPT

## 2019-09-23 PROCEDURE — 72040 X-RAY EXAM NECK SPINE 2-3 VW: CPT

## 2019-09-26 ENCOUNTER — APPOINTMENT (OUTPATIENT)
Dept: FAMILY MEDICINE | Facility: CLINIC | Age: 44
End: 2019-09-26
Payer: MEDICARE

## 2019-09-26 PROCEDURE — 96372 THER/PROPH/DIAG INJ SC/IM: CPT

## 2019-09-26 RX ORDER — CYANOCOBALAMIN 1000 UG/ML
1000 INJECTION INTRAMUSCULAR; SUBCUTANEOUS
Qty: 0 | Refills: 0 | Status: COMPLETED | OUTPATIENT
Start: 2019-09-26

## 2019-09-26 RX ADMIN — CYANOCOBALAMIN 0 MCG/ML: 1000 INJECTION INTRAMUSCULAR; SUBCUTANEOUS at 00:00

## 2019-10-01 ENCOUNTER — RX RENEWAL (OUTPATIENT)
Age: 44
End: 2019-10-01

## 2019-10-10 ENCOUNTER — RX RENEWAL (OUTPATIENT)
Age: 44
End: 2019-10-10

## 2019-10-15 ENCOUNTER — RX RENEWAL (OUTPATIENT)
Age: 44
End: 2019-10-15
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
## 2019-10-17 DIAGNOSIS — M54.5 LOW BACK PAIN: ICD-10-CM
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
## 2019-10-17 DIAGNOSIS — M54.9 DORSALGIA, UNSPECIFIED: ICD-10-CM
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
## 2019-10-17 DIAGNOSIS — M54.2 CERVICALGIA: ICD-10-CM

## 2019-10-23 ENCOUNTER — RX RENEWAL (OUTPATIENT)
Age: 44
End: 2019-10-23

## 2019-10-24 ENCOUNTER — APPOINTMENT (OUTPATIENT)
Dept: FAMILY MEDICINE | Facility: CLINIC | Age: 44
End: 2019-10-24
Payer: MEDICARE

## 2019-10-24 PROCEDURE — 96372 THER/PROPH/DIAG INJ SC/IM: CPT

## 2019-10-24 RX ORDER — CYANOCOBALAMIN 1000 UG/ML
1000 INJECTION INTRAMUSCULAR; SUBCUTANEOUS
Qty: 0 | Refills: 0 | Status: COMPLETED | OUTPATIENT
Start: 2019-10-24

## 2019-10-24 RX ADMIN — CYANOCOBALAMIN 0 MCG/ML: 1000 INJECTION INTRAMUSCULAR; SUBCUTANEOUS at 00:00

## 2019-11-11 ENCOUNTER — APPOINTMENT (OUTPATIENT)
Dept: ORTHOPEDIC SURGERY | Facility: CLINIC | Age: 44
End: 2019-11-11
Payer: COMMERCIAL

## 2019-11-11 ENCOUNTER — RX RENEWAL (OUTPATIENT)
Age: 44
End: 2019-11-11

## 2019-11-11 PROCEDURE — 73030 X-RAY EXAM OF SHOULDER: CPT | Mod: LT

## 2019-11-11 PROCEDURE — 99203 OFFICE O/P NEW LOW 30 MIN: CPT

## 2019-11-11 NOTE — END OF VISIT
[FreeTextEntry3] : All medical record entries made by the Scribe were at my, Dr. Manuel Regalaod, direction and personally dictated by me on 11/11/2019. I have reviewed the chart and agree that the record accurately reflects my personal performance of the history, physical exam, assessment and plan. I have also personally directed, reviewed, and agreed with the chart.

## 2019-11-11 NOTE — ADDENDUM
[FreeTextEntry1] : I, Peggy Hart, acted solely as a scribe for Dr. Manuel Regalado on this date 11/11/2019.

## 2019-11-11 NOTE — PHYSICAL EXAM
[Normal] : Gait: normal [Cane] : ambulates with cane [Normal RUE] : Right Upper Extremity: No scars, rashes, lesions, ulcers, skin intact [Normal LUE] : Left Upper Extremity: No scars, rashes, lesions, ulcers, skin intact [Obese] : obese [Poor Appearance] : well-appearing [Acute Distress] : not in acute distress [de-identified] : Right Upper Extremity\par o Shoulder :\par ¦ Inspection/Palpation : no tenderness, no swelling, no deformities\par ¦ Range of Motion : ACTIVE FORWARD ELEVATION: Measured at 150 degrees, ACTIVE EXTERNAL ROTATION: Measured at 50 degrees, ACTIVE INTERNAL ROTATION: Measured at T12,  ACTIVE ABDUCTION: Measured at 130 degrees \par ¦ Strength : external rotation 5/5, internal rotation 5/5, supraspinatus 5/5\par ¦ Stability : no joint instability on provocative testing\par o Upper Arm : no tenderness, no swelling, no deformities\par o Muscle Bulk : no atrophy\par o Sensation : sensation intact to light touch\par o Skin : no skin rash or discoloration\par o Vascular Exam : no edema, no cyanosis, radial and ulnar pulses normal \par \par Left Upper Extremity\par o Shoulder :\par ¦ Inspection/Palpation : tenderness over the greater tuberosity and acromioclavicular joint, no swelling, no deformities\par ¦ Range of Motion : ACTIVE FORWARD ELEVATION: Measured at 130 degrees, ACTIVE EXTERNAL ROTATION: Measured at 50 degrees, ACTIVE INTERNAL ROTATION: Measured at L1,  ACTIVE ABDUCTION: Measured at 90 degrees \par ¦ Strength : external rotation 5/5, internal rotation 5/5, supraspinatus 5/5\par ¦ Stability : no joint instability on provocative testing\par ¦ Tests/Signs : Neer (+ mild), Abbott (+)\par o Upper Arm : no tenderness, no swelling, no deformities\par o Muscle Bulk : no atrophy\par o Sensation : sensation intact to light touch\par o Skin : no skin rash or discoloration\par o Vascular Exam : no edema, no cyanosis, radial and ulnar pulses normal  [de-identified] : o Left Shoulder : Internal/External rotation, and outlet views were obtained, there are no soft tissue abnormalities, no fractures, alignment is normal, normal appearing joint spaces, normal bone density, no bony lesions.

## 2019-11-11 NOTE — DISCUSSION/SUMMARY
[de-identified] : The underlying pathophysiology was reviewed in great detail with the patient as well as the various treatment options, including ice, analgesics, NSAIDs, Physical therapy, steroid injections. \par \par An MRI of the left shoulder was ordered to rule out rotator cuff tear. \par \par FU after imaging is obtained.
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
## 2019-11-15 ENCOUNTER — APPOINTMENT (OUTPATIENT)
Dept: FAMILY MEDICINE | Facility: CLINIC | Age: 44
End: 2019-11-15

## 2019-11-18 ENCOUNTER — APPOINTMENT (OUTPATIENT)
Dept: ORTHOPEDIC SURGERY | Facility: CLINIC | Age: 44
End: 2019-11-18
Payer: COMMERCIAL

## 2019-11-18 VITALS
HEART RATE: 82 BPM | DIASTOLIC BLOOD PRESSURE: 94 MMHG | WEIGHT: 293 LBS | HEIGHT: 68 IN | BODY MASS INDEX: 44.41 KG/M2 | SYSTOLIC BLOOD PRESSURE: 156 MMHG

## 2019-11-18 PROCEDURE — 99214 OFFICE O/P EST MOD 30 MIN: CPT
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
## 2019-11-18 NOTE — PHYSICAL EXAM
[Normal] : Gait: normal [Marmolejo's Sign] : negative Marmolejo's sign [Pronator Drift] : negative pronator drift [SLR] : negative straight leg raise [de-identified] : MRI 10/01/2019 \par report only\par C5-C6 disc bulging-reviewed with the patient. [de-identified] : 5 out of 5 motor strength, sensation is intact and symmetrical full range of motion flexion extension and rotation, no palpatory tenderness full range of motion of hips knees shoulders and elbows, no atrophy, negative straight leg raise, no pathological reflexes, no swelling, normal ambulation, no apparent distress, skin is intact, no palpable lymph nodes, no upper or lower extremity instability, alert and oriented x3 and normal mood. normal finger to nose test.\par

## 2019-11-18 NOTE — ADDENDUM
[FreeTextEntry1] : This note was authored by Jamie Schmidt working as a medical scribe for Dr. Tadeo Huizar. The note was reviewed, edited, and revised by Dr. Tadeo Huizar whom is in agreement with the exam findings, imaging findings, and treatment plan. 11/18/2019.\par

## 2019-11-21 ENCOUNTER — FORM ENCOUNTER (OUTPATIENT)
Age: 44
End: 2019-11-21

## 2019-11-21 ENCOUNTER — RX RENEWAL (OUTPATIENT)
Age: 44
End: 2019-11-21

## 2019-11-22 ENCOUNTER — APPOINTMENT (OUTPATIENT)
Dept: MRI IMAGING | Facility: CLINIC | Age: 44
End: 2019-11-22
Payer: COMMERCIAL

## 2019-11-22 ENCOUNTER — OUTPATIENT (OUTPATIENT)
Dept: OUTPATIENT SERVICES | Facility: HOSPITAL | Age: 44
LOS: 1 days | End: 2019-11-22
Payer: COMMERCIAL

## 2019-11-22 DIAGNOSIS — Z00.8 ENCOUNTER FOR OTHER GENERAL EXAMINATION: ICD-10-CM

## 2019-11-22 PROCEDURE — 73221 MRI JOINT UPR EXTREM W/O DYE: CPT

## 2019-11-22 PROCEDURE — 73221 MRI JOINT UPR EXTREM W/O DYE: CPT | Mod: 26,LT

## 2019-12-05 ENCOUNTER — APPOINTMENT (OUTPATIENT)
Dept: ORTHOPEDIC SURGERY | Facility: CLINIC | Age: 44
End: 2019-12-05
Payer: COMMERCIAL

## 2019-12-05 PROCEDURE — 20605 DRAIN/INJ JOINT/BURSA W/O US: CPT | Mod: LT

## 2019-12-05 PROCEDURE — 99213 OFFICE O/P EST LOW 20 MIN: CPT | Mod: 25
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
## 2019-12-05 NOTE — END OF VISIT
[FreeTextEntry3] : All medical record entries made by the [facility] were at my, Dr. Manuel Regalado, direction and personally dictated by me on 12/05/2019. I have reviewed the chart and agree that the record accurately reflects my personal performance of the history, physical exam, assessment and plan. I have also personally directed, reviewed, and agreed with the chart.

## 2019-12-05 NOTE — PROCEDURE
[de-identified] : At this point I recommended a therapeutic injection and under sterile precautions an injection of 4 cc 1% lidocaine with 0.5 cc of Kenalog and 0.5 cc of Dexamethasone- was placed into the acromioclavicular joint of the Left shoulder without complication, and after several minutes, the patient felt significant relief.

## 2019-12-05 NOTE — ADDENDUM
[FreeTextEntry1] : I, Peggy Hart, acted solely as a scribe for Dr. Manuel Regalado on this date 12/05/2019.

## 2019-12-05 NOTE — PHYSICAL EXAM
[Normal RUE] : Right Upper Extremity: No scars, rashes, lesions, ulcers, skin intact [Cane] : ambulates with cane [Normal LUE] : Left Upper Extremity: No scars, rashes, lesions, ulcers, skin intact [Obese] : obese [Poor Appearance] : well-appearing [Acute Distress] : not in acute distress [de-identified] : Left Upper Extremity\par o Shoulder :\par ¦ Inspection/Palpation : moderate tenderness over the acromioclavicular joint, mild tenderness over the greater tuberosity, no swelling, no deformities\par ¦ Range of Motion : ACTIVE FORWARD ELEVATION: Measured at 130 degrees, ACTIVE EXTERNAL ROTATION: Measured at 55 degrees, ACTIVE INTERNAL ROTATION: Measured at T10,  ACTIVE ABDUCTION: Measured at 100 degrees \par ¦ Strength : external rotation 5/5, internal rotation 5/5, supraspinatus 5/5\par ¦ Stability : no joint instability on provocative testing\par ¦ Tests/Signs : Neer (+ mild), Abbott (+), Cross Body Adduction Test (+) \par o Upper Arm : no tenderness, no swelling, no deformities\par o Muscle Bulk : no atrophy\par o Sensation : sensation intact to light touch\par o Skin : no skin rash or discoloration\par o Vascular Exam : no edema, no cyanosis, radial and ulnar pulses normal  [de-identified] : o An MRI of the left shoulder was obtained at [facility] on 11/22/2019, revealed:\par 1. Mild glenohumeral arthrosis with a tiny glenohumeral joint effusion. \par 2. Mild supraspinatus and infraspinatus tendinosis. \par 3. Mild-to-moderate AC joint arthrosis. \par (No significant change since 6/15/2019)

## 2019-12-05 NOTE — DISCUSSION/SUMMARY
[de-identified] : The underlying pathophysiology was reviewed in great detail with the patient as well as the various treatment options, including ice, analgesics, NSAIDs, Physical therapy, steroid injections. \par \par The patient wishes to proceed with an INJECTION of the left shoulder. \par \par She is to continue with Physical Therapy.\par \par FU 4 weeks.

## 2019-12-09 ENCOUNTER — RX RENEWAL (OUTPATIENT)
Age: 44
End: 2019-12-09

## 2019-12-09 ENCOUNTER — APPOINTMENT (OUTPATIENT)
Dept: NEUROLOGY | Facility: CLINIC | Age: 44
End: 2019-12-09

## 2019-12-30 ENCOUNTER — APPOINTMENT (OUTPATIENT)
Dept: FAMILY MEDICINE | Facility: CLINIC | Age: 44
End: 2019-12-30
Payer: MEDICARE

## 2019-12-30 VITALS
WEIGHT: 293 LBS | DIASTOLIC BLOOD PRESSURE: 80 MMHG | BODY MASS INDEX: 44.41 KG/M2 | SYSTOLIC BLOOD PRESSURE: 125 MMHG | HEART RATE: 76 BPM | RESPIRATION RATE: 20 BRPM | HEIGHT: 68 IN

## 2019-12-30 DIAGNOSIS — M12.9 ARTHROPATHY, UNSPECIFIED: ICD-10-CM

## 2019-12-30 PROCEDURE — 99214 OFFICE O/P EST MOD 30 MIN: CPT | Mod: 25

## 2019-12-30 PROCEDURE — 36415 COLL VENOUS BLD VENIPUNCTURE: CPT

## 2019-12-30 PROCEDURE — 96372 THER/PROPH/DIAG INJ SC/IM: CPT

## 2019-12-30 RX ORDER — CYANOCOBALAMIN 1000 UG/ML
1000 INJECTION INTRAMUSCULAR; SUBCUTANEOUS
Qty: 0 | Refills: 0 | Status: COMPLETED | OUTPATIENT
Start: 2019-12-30

## 2019-12-30 RX ADMIN — CYANOCOBALAMIN 0 MCG/ML: 1000 INJECTION INTRAMUSCULAR; SUBCUTANEOUS at 00:00

## 2019-12-30 NOTE — REVIEW OF SYSTEMS
[Joint Stiffness] : joint stiffness [Joint Pain] : joint pain [Muscle Pain] : muscle pain [Muscle Weakness] : muscle weakness [Unsteady Walking] : ataxia [Negative] : Psychiatric

## 2019-12-30 NOTE — PHYSICAL EXAM
[No Acute Distress] : no acute distress [No Edema] : there was no peripheral edema [Normal] : soft, non-tender, non-distended, no masses palpated, no HSM and normal bowel sounds [Normal Posterior Cervical Nodes] : no posterior cervical lymphadenopathy [Normal Anterior Cervical Nodes] : no anterior cervical lymphadenopathy [Limited Balance] : the patient's balance was impaired [Motor Strength Lower Extremities Bilaterally] : there was weakness in both lower extremities [Speech Grossly Normal] : speech grossly normal [Memory Grossly Normal] : memory grossly normal [Normal Affect] : the affect was normal [Normal Mood] : the mood was normal [Alert and Oriented x3] : oriented to person, place, and time [Normal Insight/Judgement] : insight and judgment were intact [de-identified] : using cane for ambulation

## 2019-12-30 NOTE — ASSESSMENT
[FreeTextEntry1] : Hemodynamically stable with acceptable BP\par Asthma quiescent at present\par Findings otherwise consistent with history - continue proper use of all medication\par Lab profiles drawn in office and sent\par B12 1ml IM given R deltoid

## 2019-12-30 NOTE — HISTORY OF PRESENT ILLNESS
[de-identified] : Presents for BP check, labs, and general follow-up.  Also due for B12 injection.  Following with [facility]; also following with accupuncture and chiropractic; using pain mgt very appropriately to maintain daily functioning and QOL with no evidence of intoxication or euphoria.  Reviewed diet - note desirable wt loss; pt states not self-checking.

## 2019-12-31 LAB
25(OH)D3 SERPL-MCNC: 35.5 NG/ML
ALBUMIN SERPL ELPH-MCNC: 4.9 G/DL
ALP BLD-CCNC: 85 U/L
ALT SERPL-CCNC: 9 U/L
ANION GAP SERPL CALC-SCNC: 16 MMOL/L
AST SERPL-CCNC: 9 U/L
BASOPHILS # BLD AUTO: 0.05 K/UL
BASOPHILS NFR BLD AUTO: 0.7 %
BILIRUB SERPL-MCNC: 0.3 MG/DL
BUN SERPL-MCNC: 9 MG/DL
CALCIUM SERPL-MCNC: 9.6 MG/DL
CHLORIDE SERPL-SCNC: 102 MMOL/L
CHOLEST SERPL-MCNC: 165 MG/DL
CHOLEST/HDLC SERPL: 3 RATIO
CO2 SERPL-SCNC: 24 MMOL/L
CREAT SERPL-MCNC: 0.8 MG/DL
CRP SERPL-MCNC: 0.14 MG/DL
EOSINOPHIL # BLD AUTO: 0.04 K/UL
EOSINOPHIL NFR BLD AUTO: 0.5 %
ESTIMATED AVERAGE GLUCOSE: 123 MG/DL
FOLATE SERPL-MCNC: 7.3 NG/ML
GLUCOSE SERPL-MCNC: 112 MG/DL
HBA1C MFR BLD HPLC: 5.9 %
HCT VFR BLD CALC: 45 %
HDLC SERPL-MCNC: 55 MG/DL
HGB BLD-MCNC: 14.1 G/DL
IMM GRANULOCYTES NFR BLD AUTO: 0.4 %
LDLC SERPL CALC-MCNC: 98 MG/DL
LYMPHOCYTES # BLD AUTO: 2.33 K/UL
LYMPHOCYTES NFR BLD AUTO: 31.6 %
MAGNESIUM SERPL-MCNC: 1.9 MG/DL
MAN DIFF?: NORMAL
MCHC RBC-ENTMCNC: 27.2 PG
MCHC RBC-ENTMCNC: 31.3 GM/DL
MCV RBC AUTO: 86.7 FL
MONOCYTES # BLD AUTO: 0.39 K/UL
MONOCYTES NFR BLD AUTO: 5.3 %
NEUTROPHILS # BLD AUTO: 4.54 K/UL
NEUTROPHILS NFR BLD AUTO: 61.5 %
PLATELET # BLD AUTO: 337 K/UL
POTASSIUM SERPL-SCNC: 3.5 MMOL/L
PROT SERPL-MCNC: 7.3 G/DL
RBC # BLD: 5.19 M/UL
RBC # FLD: 14.7 %
SODIUM SERPL-SCNC: 142 MMOL/L
T3FREE SERPL-MCNC: 2.51 PG/ML
T4 FREE SERPL-MCNC: 1.1 NG/DL
TRIGL SERPL-MCNC: 60 MG/DL
TSH SERPL-ACNC: 3.23 UIU/ML
VIT B12 SERPL-MCNC: 389 PG/ML
WBC # FLD AUTO: 7.38 K/UL

## 2020-01-02 ENCOUNTER — RX RENEWAL (OUTPATIENT)
Age: 45
End: 2020-01-02

## 2020-01-09 ENCOUNTER — APPOINTMENT (OUTPATIENT)
Dept: ORTHOPEDIC SURGERY | Facility: CLINIC | Age: 45
End: 2020-01-09
Payer: COMMERCIAL

## 2020-01-09 ENCOUNTER — RX RENEWAL (OUTPATIENT)
Age: 45
End: 2020-01-09

## 2020-01-09 VITALS — BODY MASS INDEX: 44.41 KG/M2 | HEIGHT: 68 IN | WEIGHT: 293 LBS

## 2020-01-09 DIAGNOSIS — J06.9 ACUTE UPPER RESPIRATORY INFECTION, UNSPECIFIED: ICD-10-CM

## 2020-01-09 LAB — T3REVERSE SERPL-MCNC: 15.1 NG/DL

## 2020-01-09 PROCEDURE — 99213 OFFICE O/P EST LOW 20 MIN: CPT
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
## 2020-01-10 NOTE — PHYSICAL EXAM
[Cane] : ambulates with cane [Normal RUE] : Right Upper Extremity: No scars, rashes, lesions, ulcers, skin intact [Normal LUE] : Left Upper Extremity: No scars, rashes, lesions, ulcers, skin intact [Obese] : obese [Poor Appearance] : well-appearing [Acute Distress] : not in acute distress [de-identified] : Left Upper Extremity\par o Shoulder :\par ¦ Inspection/Palpation : mild tenderness over the greater tuberosity and over the proximal biceps tendon, no swelling, no deformities\par ¦ Range of Motion : ACTIVE FORWARD ELEVATION: Measured at 160 degrees, ACTIVE EXTERNAL ROTATION: Measured at 80 degrees, ACTIVE INTERNAL ROTATION: Measured at T8, \par ¦ Strength : external rotation 5/5, internal rotation 5/5, supraspinatus 5/5\par ¦ Stability : no joint instability on provocative testing\par ¦ Tests/Signs : Neer (+ mild), Abbott (-), Speed’s Test (+ mild) \par o Upper Arm : no tenderness, no swelling, no deformities\par o Muscle Bulk : no atrophy\par o Sensation : sensation intact to light touch\par o Skin : no skin rash or discoloration\par o Vascular Exam : no edema, no cyanosis, radial and ulnar pulses normal

## 2020-01-10 NOTE — END OF VISIT
[FreeTextEntry3] : All medical record entries made by the [facility] were at my, Dr. Manuel Regalado, direction and personally dictated by me on 01/09/2020. I have reviewed the chart and agree that the record accurately reflects my personal performance of the history, physical exam, assessment and plan. I have also personally directed, reviewed, and agreed with the chart.

## 2020-01-10 NOTE — DISCUSSION/SUMMARY
[de-identified] : The underlying pathophysiology was reviewed in great detail with the patient as well as the various treatment options, including ice, analgesics, NSAIDs, Physical therapy, steroid injections. \par \par She is to continue with Physical Therapy, a prescription was provided. \par \par I advised the patient to work on good posture. \par \par FU PRN.

## 2020-01-10 NOTE — ADDENDUM
[FreeTextEntry1] : I, Peggy Hart, acted solely as a scribe for Dr. Manuel Regalado on this date 01/09/2020.

## 2020-01-13 ENCOUNTER — RX RENEWAL (OUTPATIENT)
Age: 45
End: 2020-01-13

## 2020-01-29 ENCOUNTER — APPOINTMENT (OUTPATIENT)
Dept: FAMILY MEDICINE | Facility: CLINIC | Age: 45
End: 2020-01-29
Payer: MEDICARE

## 2020-01-29 PROCEDURE — 96372 THER/PROPH/DIAG INJ SC/IM: CPT

## 2020-01-29 RX ORDER — CYANOCOBALAMIN 1000 UG/ML
1000 INJECTION INTRAMUSCULAR; SUBCUTANEOUS
Qty: 0 | Refills: 0 | Status: COMPLETED | OUTPATIENT
Start: 2020-01-29

## 2020-01-29 RX ADMIN — CYANOCOBALAMIN 0 MCG/ML: 1000 INJECTION, SOLUTION INTRAMUSCULAR at 00:00

## 2020-01-30 ENCOUNTER — RX RENEWAL (OUTPATIENT)
Age: 45
End: 2020-01-30

## 2020-01-30 RX ORDER — ASPIRIN ENTERIC COATED TABLETS 81 MG 81 MG/1
81 TABLET, DELAYED RELEASE ORAL DAILY
Qty: 90 | Refills: 1 | Status: ACTIVE | COMMUNITY
Start: 2019-07-09 | End: 1900-01-01

## 2020-02-07 RX ORDER — LORATADINE 10 MG/1
10 TABLET ORAL DAILY
Qty: 90 | Refills: 3 | Status: ACTIVE | COMMUNITY
Start: 2020-02-07 | End: 1900-01-01

## 2020-02-28 ENCOUNTER — APPOINTMENT (OUTPATIENT)
Dept: FAMILY MEDICINE | Facility: CLINIC | Age: 45
End: 2020-02-28
Payer: MEDICARE

## 2020-02-28 PROCEDURE — 96372 THER/PROPH/DIAG INJ SC/IM: CPT

## 2020-02-28 RX ORDER — CYANOCOBALAMIN 1000 UG/ML
1000 INJECTION INTRAMUSCULAR; SUBCUTANEOUS
Qty: 0 | Refills: 0 | Status: COMPLETED | OUTPATIENT
Start: 2020-02-28

## 2020-02-28 RX ADMIN — CYANOCOBALAMIN 0 MCG/ML: 1000 INJECTION INTRAMUSCULAR; SUBCUTANEOUS at 00:00

## 2020-03-09 ENCOUNTER — APPOINTMENT (OUTPATIENT)
Dept: NEUROLOGY | Facility: CLINIC | Age: 45
End: 2020-03-09

## 2020-03-19 ENCOUNTER — TRANSCRIPTION ENCOUNTER (OUTPATIENT)
Age: 45
End: 2020-03-19

## 2020-03-27 ENCOUNTER — APPOINTMENT (OUTPATIENT)
Dept: FAMILY MEDICINE | Facility: CLINIC | Age: 45
End: 2020-03-27
Payer: MEDICARE

## 2020-03-27 ENCOUNTER — APPOINTMENT (OUTPATIENT)
Dept: FAMILY MEDICINE | Facility: CLINIC | Age: 45
End: 2020-03-27

## 2020-03-27 DIAGNOSIS — Z86.19 PERSONAL HISTORY OF OTHER INFECTIOUS AND PARASITIC DISEASES: ICD-10-CM

## 2020-03-27 PROCEDURE — G2012 BRIEF CHECK IN BY MD/QHP: CPT

## 2020-04-03 ENCOUNTER — APPOINTMENT (OUTPATIENT)
Dept: NEUROLOGY | Facility: CLINIC | Age: 45
End: 2020-04-03

## 2020-05-03 ENCOUNTER — RX RENEWAL (OUTPATIENT)
Age: 45
End: 2020-05-03

## 2020-05-18 ENCOUNTER — APPOINTMENT (OUTPATIENT)
Dept: FAMILY MEDICINE | Facility: CLINIC | Age: 45
End: 2020-05-18
Payer: MEDICARE

## 2020-05-18 PROCEDURE — 99213 OFFICE O/P EST LOW 20 MIN: CPT | Mod: 95

## 2020-05-18 RX ORDER — LORATADINE 5 MG
17 TABLET,CHEWABLE ORAL
Qty: 1 | Refills: 3 | Status: ACTIVE | COMMUNITY
Start: 2020-05-18 | End: 1900-01-01

## 2020-05-18 RX ORDER — HYDROCORTISONE 2.5% 25 MG/G
2.5 CREAM TOPICAL
Qty: 1 | Refills: 5 | Status: ACTIVE | COMMUNITY
Start: 2020-05-18 | End: 1900-01-01

## 2020-05-18 NOTE — ASSESSMENT
[FreeTextEntry1] : Most consistent with hemorrhoid and probable anal fissure based on history - advised patient to increase water intake; use "Miralax;" Proctozone ordered \par Also discussed seeing GI for colonoscopy when elective procedures are resumed

## 2020-05-18 NOTE — REVIEW OF SYSTEMS
[Abdominal Pain] : no abdominal pain [Nausea] : no nausea [Constipation] : constipation [Diarrhea] : diarrhea [Vomiting] : no vomiting [Heartburn] : no heartburn [Melena] : no melena [Negative] : Genitourinary

## 2020-05-18 NOTE — PHYSICAL EXAM
[No Acute Distress] : no acute distress [Normal Sclera/Conjunctiva] : normal sclera/conjunctiva [EOMI] : extraocular movements intact [Supple] : supple [No Respiratory Distress] : no respiratory distress  [No Focal Deficits] : no focal deficits [Speech Grossly Normal] : speech grossly normal [Memory Grossly Normal] : memory grossly normal [Normal Affect] : the affect was normal [Alert and Oriented x3] : oriented to person, place, and time [Normal Mood] : the mood was normal [Normal Insight/Judgement] : insight and judgment were intact

## 2020-05-18 NOTE — HISTORY OF PRESENT ILLNESS
[Home] : at home, [unfilled] , at the time of the visit. [Medical Office: ([facility])___] : at the medical office located in  [Patient] : the patient [Self] : self [FreeTextEntry8] : Patient requested this acute televideo visit.  States has had anal pain for several days - states "slid on the bed" and stretched the area; saw a small amount of blood after this event, but no blood since.  Used "Preparation H Gel" which caused burning.  States very uncomfortable.  Also noted a "lump" near the anus.  Denies abdominal pain, vomiting.  Does state has been constipated - last movement was 2-3 days ago.

## 2020-06-04 ENCOUNTER — APPOINTMENT (OUTPATIENT)
Dept: ORTHOPEDIC SURGERY | Facility: CLINIC | Age: 45
End: 2020-06-04
Payer: COMMERCIAL

## 2020-06-04 PROCEDURE — 99213 OFFICE O/P EST LOW 20 MIN: CPT | Mod: 25

## 2020-06-04 PROCEDURE — 20610 DRAIN/INJ JOINT/BURSA W/O US: CPT | Mod: LT

## 2020-06-04 NOTE — DISCUSSION/SUMMARY
[de-identified] : The underlying pathophysiology was reviewed in great detail with the patient as well as the various treatment options, including ice, analgesics, NSAIDs, Physical therapy, steroid injections. \par \par She is to continue with Physical Therapy, a prescription was provided. \par \par The patient wishes to proceed with an INJECTION of the Left shoulder today. \par \par I advised the patient to work on good posture. \par \par FU PRN.
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
## 2020-06-04 NOTE — PHYSICAL EXAM
[Cane] : ambulates with cane [Normal RUE] : Right Upper Extremity: No scars, rashes, lesions, ulcers, skin intact [Normal LUE] : Left Upper Extremity: No scars, rashes, lesions, ulcers, skin intact [Obese] : obese [Poor Appearance] : well-appearing [Acute Distress] : not in acute distress [de-identified] : Right Upper Extremity\par o Shoulder :\par ¦ Inspection/Palpation :no tenderness, no swelling, no deformities\par ¦ Range of Motion : ACTIVE FORWARD ELEVATION: Measured at 150 degrees, ACTIVE EXTERNAL ROTATION: Measured at 75 degrees, ACTIVE INTERNAL ROTATION: Measured at T10, ACTIVE  ABDUCTION: Measured at 150 degrees \par ¦ Strength : external rotation 5/5, internal rotation 5/5, supraspinatus 5/5 \par ¦ Stability : no joint instability on provocative testing\par ¦ Tests/Signs : Neer (-), Abbott (-), Speed’s Test (-) \par o Upper Arm : no tenderness, no swelling, no deformities\par o Muscle Bulk : no atrophy\par o Sensation : sensation intact to light touch\par o Skin : no skin rash or discoloration\par o Vascular Exam : no edema, no cyanosis, radial and ulnar pulses normal \par \par Left Upper Extremity\par o Shoulder :\par ¦ Inspection/Palpation : mild tenderness over the greater tuberosity and over the proximal biceps tendon, no swelling, no deformities\par ¦ Range of Motion : ACTIVE FORWARD ELEVATION: Measured at 105 degrees, ACTIVE EXTERNAL ROTATION: Measured at 65 degrees, ACTIVE INTERNAL ROTATION: Measured at L5, ACTIVE ABDUCTION: measured at 55 degrees \par ¦ Strength : external rotation 5-/5, internal rotation 5-/5, supraspinatus 5/5 all with pain\par ¦ Stability : no joint instability on provocative testing\par ¦ Tests/Signs : Neer (+), Abbott (+), Speed’s Test (+ mild) \par o Upper Arm : no tenderness, no swelling, no deformities\par o Muscle Bulk : no atrophy\par o Sensation : sensation intact to light touch\par o Skin : no skin rash or discoloration\par o Vascular Exam : no edema, no cyanosis, radial and ulnar pulses normal

## 2020-06-04 NOTE — PHYSICAL EXAM
[Normal RUE] : Right Upper Extremity: No scars, rashes, lesions, ulcers, skin intact [Cane] : ambulates with cane [Normal LUE] : Left Upper Extremity: No scars, rashes, lesions, ulcers, skin intact [Obese] : obese [Poor Appearance] : well-appearing [Acute Distress] : not in acute distress [de-identified] : Right Upper Extremity\par o Shoulder :\par ¦ Inspection/Palpation :no tenderness, no swelling, no deformities\par ¦ Range of Motion : ACTIVE FORWARD ELEVATION: Measured at 150 degrees, ACTIVE EXTERNAL ROTATION: Measured at 75 degrees, ACTIVE INTERNAL ROTATION: Measured at T10, ACTIVE  ABDUCTION: Measured at 150 degrees \par ¦ Strength : external rotation 5/5, internal rotation 5/5, supraspinatus 5/5 \par ¦ Stability : no joint instability on provocative testing\par ¦ Tests/Signs : Neer (-), Abbott (-), Speed’s Test (-) \par o Upper Arm : no tenderness, no swelling, no deformities\par o Muscle Bulk : no atrophy\par o Sensation : sensation intact to light touch\par o Skin : no skin rash or discoloration\par o Vascular Exam : no edema, no cyanosis, radial and ulnar pulses normal \par \par Left Upper Extremity\par o Shoulder :\par ¦ Inspection/Palpation : mild tenderness over the greater tuberosity and over the proximal biceps tendon, no swelling, no deformities\par ¦ Range of Motion : ACTIVE FORWARD ELEVATION: Measured at 105 degrees, ACTIVE EXTERNAL ROTATION: Measured at 65 degrees, ACTIVE INTERNAL ROTATION: Measured at L5, ACTIVE ABDUCTION: measured at 55 degrees \par ¦ Strength : external rotation 5-/5, internal rotation 5-/5, supraspinatus 5/5 all with pain\par ¦ Stability : no joint instability on provocative testing\par ¦ Tests/Signs : Neer (+), Abbott (+), Speed’s Test (+ mild) \par o Upper Arm : no tenderness, no swelling, no deformities\par o Muscle Bulk : no atrophy\par o Sensation : sensation intact to light touch\par o Skin : no skin rash or discoloration\par o Vascular Exam : no edema, no cyanosis, radial and ulnar pulses normal

## 2020-06-04 NOTE — DISCUSSION/SUMMARY
[de-identified] : The underlying pathophysiology was reviewed in great detail with the patient as well as the various treatment options, including ice, analgesics, NSAIDs, Physical therapy, steroid injections. \par \par She is to continue with Physical Therapy, a prescription was provided. \par \par The patient wishes to proceed with an INJECTION of the Left shoulder today. \par \par I advised the patient to work on good posture. \par \par FU PRN.

## 2020-06-04 NOTE — PROCEDURE
[de-identified] : At this point I recommended a therapeutic injection and under sterile precautions an injection of 4 cc 1% lidocaine with 0.5 cc of Kenalog and 0.5 cc of Dexamethasone- was placed into the subacromial space of the Left shoulder without complication, and after several minutes, the patient felt significant relief.\par \par \par \par

## 2020-06-04 NOTE — PHYSICAL EXAM
[Normal RUE] : Right Upper Extremity: No scars, rashes, lesions, ulcers, skin intact [Cane] : ambulates with cane [Normal LUE] : Left Upper Extremity: No scars, rashes, lesions, ulcers, skin intact [Obese] : obese [Poor Appearance] : well-appearing [Acute Distress] : not in acute distress [de-identified] : Right Upper Extremity\par o Shoulder :\par ¦ Inspection/Palpation :no tenderness, no swelling, no deformities\par ¦ Range of Motion : ACTIVE FORWARD ELEVATION: Measured at 150 degrees, ACTIVE EXTERNAL ROTATION: Measured at 75 degrees, ACTIVE INTERNAL ROTATION: Measured at T10, ACTIVE  ABDUCTION: Measured at 150 degrees \par ¦ Strength : external rotation 5/5, internal rotation 5/5, supraspinatus 5/5 \par ¦ Stability : no joint instability on provocative testing\par ¦ Tests/Signs : Neer (-), Abbott (-), Speed’s Test (-) \par o Upper Arm : no tenderness, no swelling, no deformities\par o Muscle Bulk : no atrophy\par o Sensation : sensation intact to light touch\par o Skin : no skin rash or discoloration\par o Vascular Exam : no edema, no cyanosis, radial and ulnar pulses normal \par \par Left Upper Extremity\par o Shoulder :\par ¦ Inspection/Palpation : mild tenderness over the greater tuberosity and over the proximal biceps tendon, no swelling, no deformities\par ¦ Range of Motion : ACTIVE FORWARD ELEVATION: Measured at 105 degrees, ACTIVE EXTERNAL ROTATION: Measured at 65 degrees, ACTIVE INTERNAL ROTATION: Measured at L5, ACTIVE ABDUCTION: measured at 55 degrees \par ¦ Strength : external rotation 5-/5, internal rotation 5-/5, supraspinatus 5/5 all with pain\par ¦ Stability : no joint instability on provocative testing\par ¦ Tests/Signs : Neer (+), Abbott (+), Speed’s Test (+ mild) \par o Upper Arm : no tenderness, no swelling, no deformities\par o Muscle Bulk : no atrophy\par o Sensation : sensation intact to light touch\par o Skin : no skin rash or discoloration\par o Vascular Exam : no edema, no cyanosis, radial and ulnar pulses normal
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
## 2020-06-04 NOTE — PROCEDURE
[de-identified] : At this point I recommended a therapeutic injection and under sterile precautions an injection of 4 cc 1% lidocaine with 0.5 cc of Kenalog and 0.5 cc of Dexamethasone- was placed into the subacromial space of the Left shoulder without complication, and after several minutes, the patient felt significant relief.\par \par \par \par
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
## 2020-06-04 NOTE — DISCUSSION/SUMMARY
[de-identified] : The underlying pathophysiology was reviewed in great detail with the patient as well as the various treatment options, including ice, analgesics, NSAIDs, Physical therapy, steroid injections. \par \par She is to continue with Physical Therapy, a prescription was provided. \par \par The patient wishes to proceed with an INJECTION of the Left shoulder today. \par \par I advised the patient to work on good posture. \par \par FU PRN.

## 2020-06-04 NOTE — PROCEDURE
[de-identified] : At this point I recommended a therapeutic injection and under sterile precautions an injection of 4 cc 1% lidocaine with 0.5 cc of Kenalog and 0.5 cc of Dexamethasone- was placed into the subacromial space of the Left shoulder without complication, and after several minutes, the patient felt significant relief.\par \par \par \par

## 2020-06-26 RX ORDER — BLOOD SUGAR DIAGNOSTIC
STRIP MISCELLANEOUS
Qty: 3 | Refills: 3 | Status: ACTIVE | COMMUNITY
Start: 2018-08-06 | End: 1900-01-01

## 2020-07-07 ENCOUNTER — APPOINTMENT (OUTPATIENT)
Dept: FAMILY MEDICINE | Facility: CLINIC | Age: 45
End: 2020-07-07
Payer: MEDICARE

## 2020-07-07 VITALS
RESPIRATION RATE: 20 BRPM | WEIGHT: 271 LBS | HEART RATE: 76 BPM | DIASTOLIC BLOOD PRESSURE: 70 MMHG | SYSTOLIC BLOOD PRESSURE: 124 MMHG | BODY MASS INDEX: 41.07 KG/M2 | HEIGHT: 68 IN

## 2020-07-07 PROCEDURE — 96372 THER/PROPH/DIAG INJ SC/IM: CPT

## 2020-07-07 PROCEDURE — 36415 COLL VENOUS BLD VENIPUNCTURE: CPT

## 2020-07-07 PROCEDURE — 99214 OFFICE O/P EST MOD 30 MIN: CPT | Mod: 25

## 2020-07-07 RX ORDER — CYANOCOBALAMIN 1000 UG/ML
1000 INJECTION INTRAMUSCULAR; SUBCUTANEOUS
Qty: 0 | Refills: 0 | Status: COMPLETED | OUTPATIENT
Start: 2020-07-07

## 2020-07-07 RX ADMIN — CYANOCOBALAMIN 0 MCG/ML: 1000 INJECTION INTRAMUSCULAR; SUBCUTANEOUS at 00:00

## 2020-07-07 NOTE — HISTORY OF PRESENT ILLNESS
[de-identified] : Presents for BP check, labs, and general follow-up.  Also due for B12 injection.  States trying to watch diet - note very desirable wt loss since last visit.  Reviewed medication - using pain mgt very appropriately to maintain daily functioning with no adverse effects.

## 2020-07-07 NOTE — REVIEW OF SYSTEMS
[Joint Pain] : joint pain [Muscle Weakness] : muscle weakness [Back Pain] : back pain [Negative] : Genitourinary

## 2020-07-07 NOTE — PHYSICAL EXAM
[No Acute Distress] : no acute distress [Normal] : normal rate, regular rhythm, normal S1 and S2 and no murmur heard [No Edema] : there was no peripheral edema [Soft] : abdomen soft [Muscle Spasms, Bilateral] : bilateral muscle spasms [Non Tender] : non-tender [Motor Strength Lower Extremities Bilaterally] : there was weakness in both lower extremities [No Focal Deficits] : no focal deficits [Limited Balance] : the patient's balance was impaired [Speech Grossly Normal] : speech grossly normal [Normal Affect] : the affect was normal [Memory Grossly Normal] : memory grossly normal [Normal Mood] : the mood was normal [Alert and Oriented x3] : oriented to person, place, and time [Normal Insight/Judgement] : insight and judgment were intact [de-identified] : using cane for mobility

## 2020-07-07 NOTE — ASSESSMENT
[FreeTextEntry1] : Hemodynamically stable with acceptable BP\par Neuromuscular findings consistent with history - continue proper use of all medication\par Asthma stable at this encounter\par Lab profiles drawn in office and sent\par B12 1ml IM given R deltoid

## 2020-07-08 LAB
ALBUMIN SERPL ELPH-MCNC: 4.5 G/DL
ALP BLD-CCNC: 70 U/L
ALT SERPL-CCNC: 11 U/L
ANION GAP SERPL CALC-SCNC: 14 MMOL/L
AST SERPL-CCNC: 13 U/L
BASOPHILS # BLD AUTO: 0.04 K/UL
BASOPHILS NFR BLD AUTO: 0.4 %
BILIRUB SERPL-MCNC: 0.3 MG/DL
BUN SERPL-MCNC: 11 MG/DL
CALCIUM SERPL-MCNC: 9.2 MG/DL
CHLORIDE SERPL-SCNC: 108 MMOL/L
CHOLEST SERPL-MCNC: 124 MG/DL
CHOLEST/HDLC SERPL: 3 RATIO
CO2 SERPL-SCNC: 22 MMOL/L
CREAT SERPL-MCNC: 1.26 MG/DL
EOSINOPHIL # BLD AUTO: 0.14 K/UL
EOSINOPHIL NFR BLD AUTO: 1.6 %
ESTIMATED AVERAGE GLUCOSE: 108 MG/DL
FOLATE SERPL-MCNC: 5 NG/ML
GLUCOSE SERPL-MCNC: 119 MG/DL
HBA1C MFR BLD HPLC: 5.4 %
HCT VFR BLD CALC: 41.3 %
HDLC SERPL-MCNC: 42 MG/DL
HGB BLD-MCNC: 12.7 G/DL
IMM GRANULOCYTES NFR BLD AUTO: 0.2 %
LDLC SERPL CALC-MCNC: 59 MG/DL
LYMPHOCYTES # BLD AUTO: 2.34 K/UL
LYMPHOCYTES NFR BLD AUTO: 25.9 %
MAN DIFF?: NORMAL
MCHC RBC-ENTMCNC: 27.2 PG
MCHC RBC-ENTMCNC: 30.8 GM/DL
MCV RBC AUTO: 88.4 FL
MONOCYTES # BLD AUTO: 0.52 K/UL
MONOCYTES NFR BLD AUTO: 5.8 %
NEUTROPHILS # BLD AUTO: 5.97 K/UL
NEUTROPHILS NFR BLD AUTO: 66.1 %
PLATELET # BLD AUTO: 344 K/UL
POTASSIUM SERPL-SCNC: 3.6 MMOL/L
PROT SERPL-MCNC: 6.6 G/DL
RBC # BLD: 4.67 M/UL
RBC # FLD: 14.2 %
SODIUM SERPL-SCNC: 144 MMOL/L
T4 FREE SERPL-MCNC: 1.1 NG/DL
TRIGL SERPL-MCNC: 118 MG/DL
TSH SERPL-ACNC: 1.04 UIU/ML
VIT B12 SERPL-MCNC: 342 PG/ML
WBC # FLD AUTO: 9.03 K/UL

## 2020-07-16 ENCOUNTER — APPOINTMENT (OUTPATIENT)
Dept: ORTHOPEDIC SURGERY | Facility: CLINIC | Age: 45
End: 2020-07-16
Payer: COMMERCIAL

## 2020-07-16 VITALS — BODY MASS INDEX: 40.92 KG/M2 | HEIGHT: 68 IN | WEIGHT: 270 LBS | TEMPERATURE: 96.5 F

## 2020-07-16 PROCEDURE — 99213 OFFICE O/P EST LOW 20 MIN: CPT

## 2020-07-16 NOTE — PHYSICAL EXAM
[Cane] : ambulates with cane [Normal RUE] : Right Upper Extremity: No scars, rashes, lesions, ulcers, skin intact [Normal LUE] : Left Upper Extremity: No scars, rashes, lesions, ulcers, skin intact [Obese] : obese [Acute Distress] : not in acute distress [Poor Appearance] : well-appearing [de-identified] : Right Upper Extremity\par o Shoulder :\par ¦ Inspection/Palpation :no tenderness, no swelling, no deformities\par ¦ Range of Motion : ACTIVE FORWARD ELEVATION: Measured at 150 degrees, ACTIVE EXTERNAL ROTATION: Measured at 75 degrees, ACTIVE INTERNAL ROTATION: Measured at T10, ACTIVE  ABDUCTION: Measured at 150 degrees \par ¦ Strength : external rotation 5/5, internal rotation 5/5, supraspinatus 5/5 \par ¦ Stability : no joint instability on provocative testing\par ¦ Tests/Signs : Neer (-), Abbott (-), Speed’s Test (-) \par o Upper Arm : no tenderness, no swelling, no deformities\par o Muscle Bulk : no atrophy\par o Sensation : sensation intact to light touch\par o Skin : no skin rash or discoloration\par o Vascular Exam : no edema, no cyanosis, radial and ulnar pulses normal \par \par Left Upper Extremity\par o Shoulder :\par ¦ Inspection/Palpation : mild tenderness over the greater tuberosity and over the proximal biceps tendon, no swelling, no deformities\par ¦ Range of Motion : ACTIVE FORWARD ELEVATION: Measured at 140 degrees, ACTIVE EXTERNAL ROTATION: Measured at 60 degrees, ACTIVE INTERNAL ROTATION: Measured at T10, ACTIVE ABDUCTION: measured at 110 degrees \par ¦ Strength : external rotation 5-/5, internal rotation 5-/5, supraspinatus 5/5 all with pain\par ¦ Stability : no joint instability on provocative testing\par ¦ Tests/Signs : Neer (+ mild), Abbott (+), Speed’s Test (+) \par o Upper Arm : no tenderness, no swelling, no deformities\par o Muscle Bulk : no atrophy\par o Sensation : sensation intact to light touch\par o Skin : no skin rash or discoloration\par o Vascular Exam : no edema, no cyanosis, radial and ulnar pulses normal

## 2020-07-16 NOTE — DISCUSSION/SUMMARY
[de-identified] : The underlying pathophysiology was reviewed in great detail with the patient as well as the various treatment options, including ice, analgesics, NSAIDs, Physical therapy, steroid injections. \par \par She is to continue with Physical Therapy, a prescription was provided. \par \par I advised the patient to work on good posture. \par \par FU PRN.
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
## 2020-08-03 ENCOUNTER — APPOINTMENT (OUTPATIENT)
Dept: FAMILY MEDICINE | Facility: CLINIC | Age: 45
End: 2020-08-03
Payer: MEDICARE

## 2020-08-03 VITALS — DIASTOLIC BLOOD PRESSURE: 70 MMHG | HEART RATE: 76 BPM | SYSTOLIC BLOOD PRESSURE: 125 MMHG | RESPIRATION RATE: 20 BRPM

## 2020-08-03 DIAGNOSIS — T78.40XA ALLERGY, UNSPECIFIED, INITIAL ENCOUNTER: ICD-10-CM

## 2020-08-03 PROCEDURE — 99213 OFFICE O/P EST LOW 20 MIN: CPT | Mod: 25

## 2020-08-03 PROCEDURE — 96372 THER/PROPH/DIAG INJ SC/IM: CPT

## 2020-08-03 RX ORDER — METHYLPRED ACET/NACL,ISO-OS/PF 40 MG/ML
40 VIAL (ML) INJECTION
Qty: 1 | Refills: 0 | Status: COMPLETED | OUTPATIENT
Start: 2020-08-03

## 2020-08-03 RX ORDER — CYANOCOBALAMIN 1000 UG/ML
1000 INJECTION INTRAMUSCULAR; SUBCUTANEOUS
Qty: 0 | Refills: 0 | Status: COMPLETED | OUTPATIENT
Start: 2020-08-03

## 2020-08-03 RX ADMIN — METHYLPREDNISOLONE ACETATE 0 MG/ML: 40 INJECTION, SUSPENSION INTRA-ARTICULAR; INTRALESIONAL; INTRAMUSCULAR; SOFT TISSUE at 00:00

## 2020-08-03 RX ADMIN — CYANOCOBALAMIN 0 MCG/ML: 1000 INJECTION INTRAMUSCULAR; SUBCUTANEOUS at 00:00

## 2020-08-03 NOTE — HISTORY OF PRESENT ILLNESS
[FreeTextEntry8] : Presents on acute basis - awoke today with "hives" on multiple areas of body; denies working in the garden; denies recollection of other contacts.  States "itchy" but denies difficulty swallowing, wheezing.\par Also due for B12 injection.

## 2020-08-03 NOTE — ASSESSMENT
[FreeTextEntry1] : Allergic reaction - DepoMedrol 40mg IM given L deltoid; Medrol dosePak; cool compresses\par B12 deficiency - B12 1ml IM given L deltoid

## 2020-08-03 NOTE — PHYSICAL EXAM
[No Acute Distress] : no acute distress [Supple] : supple [No Edema] : there was no peripheral edema [Normal] : normal rate, regular rhythm, normal S1 and S2 and no murmur heard [Soft] : abdomen soft [Normal Anterior Cervical Nodes] : no anterior cervical lymphadenopathy [Non Tender] : non-tender [Normal Posterior Cervical Nodes] : no posterior cervical lymphadenopathy [Skin Lesions 1] : Skin lesion: [No Focal Deficits] : no focal deficits [Alert and Oriented x3] : oriented to person, place, and time [de-identified] : no pharyngeal edema [de-identified] : urticaria noted neck, ankles, nose, around mouth

## 2020-08-04 ENCOUNTER — APPOINTMENT (OUTPATIENT)
Dept: FAMILY MEDICINE | Facility: CLINIC | Age: 45
End: 2020-08-04

## 2020-08-27 RX ORDER — LANCETS
EACH MISCELLANEOUS
Qty: 1 | Refills: 3 | Status: ACTIVE | COMMUNITY
Start: 2020-08-27 | End: 1900-01-01

## 2020-09-03 ENCOUNTER — APPOINTMENT (OUTPATIENT)
Dept: ORTHOPEDIC SURGERY | Facility: CLINIC | Age: 45
End: 2020-09-03
Payer: COMMERCIAL

## 2020-09-03 PROCEDURE — 99213 OFFICE O/P EST LOW 20 MIN: CPT

## 2020-09-03 NOTE — PHYSICAL EXAM
[Cane] : ambulates with cane [Normal RUE] : Right Upper Extremity: No scars, rashes, lesions, ulcers, skin intact [Normal LUE] : Left Upper Extremity: No scars, rashes, lesions, ulcers, skin intact [Obese] : obese [de-identified] : Cervical Spine/Neck\par Inspection/Palpation :\par ¦ Inspection : alignment midline, normal degree of lordosis present, thoracic kyphosis noted\par ¦ Skin : normal appearance, no masses, no tenderness, trachea midline, \par ¦ Palpation : musculature is nontender to palpation\par ¦ Tests and Signs : Spurling’s (+ left), Lhermitte’s (-)\par ¦ Range of Motion : arc of motion full in all planes, no crepitus pain with left rotation \par ¦ Stability : no subluxations or other evidence of instability demonstrated during range of motion testing\par o Muscle Strength : paraspinal muscle strength within normal limits\par o Muscle Tone : paraspinal muscle tone within normal limits\par o Muscle Bulk : normal, no atrophy\par o Cervical Lymph Nodes : no lymphadenopathy present\par ¦ Upper Extremity Strength : elbow flexion and extension 5/5, wrist extension, flexion, ulnar deviation and radial deviation 5/5, all intrinsic and extrinsic hand muscles 5/5\par o Special Tests: Lucas’s Reflex (-) \par  \par Right Upper Extremity\par o Shoulder :\par ¦ Inspection/Palpation :no tenderness, no swelling, no deformities\par ¦ Range of Motion : ACTIVE FORWARD ELEVATION: Measured at 160 degrees, ACTIVE EXTERNAL ROTATION: Measured at 60 degrees, ACTIVE INTERNAL ROTATION: Measured at T10, ACTIVE  ABDUCTION: Measured at 155 degrees \par ¦ Strength : external rotation 5/5, internal rotation 5/5, supraspinatus 5/5 \par ¦ Stability : no joint instability on provocative testing\par ¦ Tests/Signs : Neer (-), Abbott (+), Speed’s Test (-) \par o Upper Arm : no tenderness, no swelling, no deformities\par o Muscle Bulk : no atrophy\par o Sensation : sensation intact to light touch\par o Skin : no skin rash or discoloration\par o Vascular Exam : no edema, no cyanosis, radial and ulnar pulses normal \par \par Left Upper Extremity\par o Shoulder :\par ¦ Inspection/Palpation : mild tenderness over the greater tuberosity and over the proximal biceps tendon, no swelling, no deformities\par ¦ Range of Motion : ACTIVE FORWARD ELEVATION: Measured at 160 degrees, ACTIVE EXTERNAL ROTATION: Measured at 55 degrees, ACTIVE INTERNAL ROTATION: Measured at T10, ACTIVE ABDUCTION: measured at 145 degrees \par ¦ Strength : external rotation 5-/5, internal rotation 5-/5, supraspinatus 5/5 all with pain\par ¦ Stability : no joint instability on provocative testing\par ¦ Tests/Signs : Neer (+ mild), Abbott (+), Speed’s Test (+) \par o Upper Arm : no tenderness, no swelling, no deformities\par o Muscle Bulk : no atrophy\par o Sensation : sensation intact to light touch\par o Skin : no skin rash or discoloration\par o Vascular Exam : no edema, no cyanosis, radial and ulnar pulses normal  [Poor Appearance] : well-appearing [Acute Distress] : not in acute distress
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
## 2020-09-03 NOTE — DISCUSSION/SUMMARY
[de-identified] : The underlying pathophysiology was reviewed in great detail with the patient as well as the various treatment options, including ice, analgesics, NSAIDs, Physical therapy, steroid injections. \par \par She is to continue with Physical Therapy, a prescription was provided. The cervical spine was including on this prescription. \par \par Activity modifications and restrictions were discussed. I advised avoiding overhead lifting. I advised the patient to work on good posture. \par \par FU 6-8 weeks.\par \par All questions were answered, all alternatives discussed and the patient is in complete agreement with that plan. Follow-up appointment as instructed. Any issues and the patient will call or come in sooner.

## 2020-09-08 ENCOUNTER — APPOINTMENT (OUTPATIENT)
Dept: FAMILY MEDICINE | Facility: CLINIC | Age: 45
End: 2020-09-08
Payer: MEDICARE

## 2020-09-08 PROCEDURE — 96372 THER/PROPH/DIAG INJ SC/IM: CPT

## 2020-09-08 RX ORDER — CYANOCOBALAMIN 1000 UG/ML
1000 INJECTION INTRAMUSCULAR; SUBCUTANEOUS
Qty: 0 | Refills: 0 | Status: COMPLETED | OUTPATIENT
Start: 2020-09-08

## 2020-09-08 RX ADMIN — CYANOCOBALAMIN 0 MCG/ML: 1000 INJECTION INTRAMUSCULAR; SUBCUTANEOUS at 00:00

## 2020-10-06 ENCOUNTER — APPOINTMENT (OUTPATIENT)
Dept: FAMILY MEDICINE | Facility: CLINIC | Age: 45
End: 2020-10-06
Payer: MEDICARE

## 2020-10-06 VITALS
WEIGHT: 280 LBS | RESPIRATION RATE: 20 BRPM | BODY MASS INDEX: 42.44 KG/M2 | HEIGHT: 68 IN | DIASTOLIC BLOOD PRESSURE: 70 MMHG | HEART RATE: 76 BPM | SYSTOLIC BLOOD PRESSURE: 124 MMHG

## 2020-10-06 DIAGNOSIS — Z20.828 CONTACT WITH AND (SUSPECTED) EXPOSURE TO OTHER VIRAL COMMUNICABLE DISEASES: ICD-10-CM

## 2020-10-06 DIAGNOSIS — Z00.00 ENCOUNTER FOR GENERAL ADULT MEDICAL EXAMINATION W/OUT ABNORMAL FINDINGS: ICD-10-CM

## 2020-10-06 DIAGNOSIS — M54.16 RADICULOPATHY, LUMBAR REGION: ICD-10-CM

## 2020-10-06 PROCEDURE — 36415 COLL VENOUS BLD VENIPUNCTURE: CPT

## 2020-10-06 PROCEDURE — 99214 OFFICE O/P EST MOD 30 MIN: CPT | Mod: 25

## 2020-10-06 PROCEDURE — G0008: CPT

## 2020-10-06 PROCEDURE — 96372 THER/PROPH/DIAG INJ SC/IM: CPT

## 2020-10-06 PROCEDURE — 90686 IIV4 VACC NO PRSV 0.5 ML IM: CPT

## 2020-10-06 RX ORDER — CYANOCOBALAMIN 1000 UG/ML
1000 INJECTION INTRAMUSCULAR; SUBCUTANEOUS
Qty: 0 | Refills: 0 | Status: COMPLETED | OUTPATIENT
Start: 2020-10-06

## 2020-10-06 RX ADMIN — CYANOCOBALAMIN 0 MCG/ML: 1000 INJECTION INTRAMUSCULAR; SUBCUTANEOUS at 00:00

## 2020-10-06 NOTE — PHYSICAL EXAM
[No Acute Distress] : no acute distress [Normal] : normal rate, regular rhythm, normal S1 and S2 and no murmur heard [No Edema] : there was no peripheral edema [Soft] : abdomen soft [Non Tender] : non-tender [Normal Posterior Cervical Nodes] : no posterior cervical lymphadenopathy [Normal Anterior Cervical Nodes] : no anterior cervical lymphadenopathy [Muscle Spasms, Bilateral] : bilateral muscle spasms [Ataxic] : ataxic [Motor Strength Lower Extremities Bilaterally] : there was weakness in both lower extremities [No Focal Deficits] : no focal deficits [Limited Balance] : the patient's balance was impaired [Speech Grossly Normal] : speech grossly normal [Memory Grossly Normal] : memory grossly normal [Normal Affect] : the affect was normal [Alert and Oriented x3] : oriented to person, place, and time [Normal Mood] : the mood was normal [Normal Insight/Judgement] : insight and judgment were intact

## 2020-10-06 NOTE — HISTORY OF PRESENT ILLNESS
[de-identified] : Presents for BP check, labs, and general follow-up.  Also due for B12 injection; also note discussed immunizations and pt agrees to flu vaccine at this time due to COVID.  She does request COVID antibodies with today's labs due to possible exposure in the community; also requests HIV screening.  Discussed diet - note wt gain since last visit; does state sugars have been running in the 80s to 100s.  Trying to remain as active as possible using cane; using pain mgt very appropriately to maintain good daily functioning and QOL with no adverse effects.

## 2020-10-06 NOTE — REVIEW OF SYSTEMS
[Muscle Weakness] : muscle weakness [Back Pain] : back pain [Unsteady Walking] : ataxia [Negative] : Psychiatric

## 2020-10-06 NOTE — ASSESSMENT
[FreeTextEntry1] : Hemodynamically stable with acceptable BP\par Asthma quiescent at this encounter\par Neuromuscular findings consistent with history - continue proper use of all medication\par Lab profiles drawn in office and sent\par Flu vaccine given R deltoid\par B12 1ml IM given L deltoid

## 2020-10-08 LAB
25(OH)D3 SERPL-MCNC: 29.3 NG/ML
ALBUMIN SERPL ELPH-MCNC: 4.1 G/DL
ALP BLD-CCNC: 70 U/L
ALT SERPL-CCNC: 10 U/L
ANION GAP SERPL CALC-SCNC: 13 MMOL/L
AST SERPL-CCNC: 10 U/L
BASOPHILS # BLD AUTO: 0.04 K/UL
BASOPHILS NFR BLD AUTO: 0.6 %
BILIRUB SERPL-MCNC: 0.4 MG/DL
BUN SERPL-MCNC: 7 MG/DL
CALCIUM SERPL-MCNC: 8.6 MG/DL
CHLORIDE SERPL-SCNC: 109 MMOL/L
CHOLEST SERPL-MCNC: 130 MG/DL
CHOLEST/HDLC SERPL: 2.8 RATIO
CO2 SERPL-SCNC: 22 MMOL/L
CREAT SERPL-MCNC: 0.84 MG/DL
EOSINOPHIL # BLD AUTO: 0.06 K/UL
EOSINOPHIL NFR BLD AUTO: 0.9 %
ESTIMATED AVERAGE GLUCOSE: 108 MG/DL
FOLATE SERPL-MCNC: 6.5 NG/ML
GLUCOSE SERPL-MCNC: 112 MG/DL
HBA1C MFR BLD HPLC: 5.4 %
HCT VFR BLD CALC: 41.4 %
HDLC SERPL-MCNC: 47 MG/DL
HGB BLD-MCNC: 12.8 G/DL
HIV1+2 AB SPEC QL IA.RAPID: NONREACTIVE
IMM GRANULOCYTES NFR BLD AUTO: 0.3 %
LDLC SERPL CALC-MCNC: 67 MG/DL
LYMPHOCYTES # BLD AUTO: 2.49 K/UL
LYMPHOCYTES NFR BLD AUTO: 36.4 %
MAN DIFF?: NORMAL
MCHC RBC-ENTMCNC: 26.9 PG
MCHC RBC-ENTMCNC: 30.9 GM/DL
MCV RBC AUTO: 87.2 FL
MONOCYTES # BLD AUTO: 0.49 K/UL
MONOCYTES NFR BLD AUTO: 7.2 %
NEUTROPHILS # BLD AUTO: 3.75 K/UL
NEUTROPHILS NFR BLD AUTO: 54.6 %
PLATELET # BLD AUTO: 322 K/UL
POTASSIUM SERPL-SCNC: 3.4 MMOL/L
PROT SERPL-MCNC: 6.2 G/DL
RBC # BLD: 4.75 M/UL
RBC # FLD: 14.5 %
SARS-COV-2 IGG SERPL IA-ACNC: 0.09 INDEX
SARS-COV-2 IGG SERPL QL IA: NEGATIVE
SODIUM SERPL-SCNC: 143 MMOL/L
T4 FREE SERPL-MCNC: 1.1 NG/DL
TRIGL SERPL-MCNC: 82 MG/DL
TSH SERPL-ACNC: 1.19 UIU/ML
VIT B12 SERPL-MCNC: 375 PG/ML
WBC # FLD AUTO: 6.85 K/UL

## 2020-10-29 ENCOUNTER — APPOINTMENT (OUTPATIENT)
Dept: ORTHOPEDIC SURGERY | Facility: CLINIC | Age: 45
End: 2020-10-29
Payer: MEDICARE

## 2020-10-29 VITALS — BODY MASS INDEX: 42.44 KG/M2 | HEIGHT: 68 IN | WEIGHT: 280 LBS

## 2020-10-29 PROCEDURE — 99214 OFFICE O/P EST MOD 30 MIN: CPT

## 2020-10-29 PROCEDURE — 73060 X-RAY EXAM OF HUMERUS: CPT | Mod: LT
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
## 2020-10-29 NOTE — PHYSICAL EXAM
[Cane] : ambulates with cane [Normal RUE] : Right Upper Extremity: No scars, rashes, lesions, ulcers, skin intact [Normal LUE] : Left Upper Extremity: No scars, rashes, lesions, ulcers, skin intact [Obese] : obese [Poor Appearance] : well-appearing [Acute Distress] : not in acute distress [de-identified] : Cervical Spine/Neck\par Inspection/Palpation :\par ¦ Inspection : alignment midline, normal degree of lordosis present, thoracic kyphosis noted\par ¦ Skin : normal appearance, no masses, no tenderness, trachea midline, \par ¦ Palpation : musculature is nontender to palpation\par ¦ Tests and Signs : Spurling’s (+ left), Lhermitte’s (-)\par ¦ Range of Motion : arc of motion full in all planes, no crepitus,  pain with left rotation \par ¦ Stability : no subluxations or other evidence of instability demonstrated during range of motion testing\par o Muscle Strength : paraspinal muscle strength within normal limits\par o Muscle Tone : paraspinal muscle tone within normal limits\par o Muscle Bulk : normal, no atrophy\par o Cervical Lymph Nodes : no lymphadenopathy present\par ¦ Upper Extremity Strength : elbow flexion and extension 5/5, wrist extension, flexion, ulnar deviation and radial deviation 5/5, all intrinsic and extrinsic hand muscles 5/5\par o Special Tests: Lucas’s Reflex (-) \par  \par Right Upper Extremity\par o Shoulder :\par ¦ Inspection/Palpation :no tenderness, no swelling, no deformities\par ¦ Range of Motion : ACTIVE FORWARD ELEVATION: Measured at 150 degrees, ACTIVE EXTERNAL ROTATION: Measured at 70 degrees, ACTIVE INTERNAL ROTATION: Measured at T12, ACTIVE  ABDUCTION: Measured at 130 degrees \par ¦ Strength : external rotation 5/5, internal rotation 5/5, supraspinatus 5/5 \par ¦ Stability : no joint instability on provocative testing\par ¦ Tests/Signs : Neer (-), Abbott (+), Speed’s Test (-) \par o Upper Arm : no tenderness, no swelling, no deformities\par o Muscle Bulk : no atrophy\par o Sensation : sensation intact to light touch\par o Skin : no skin rash or discoloration\par o Vascular Exam : no edema, no cyanosis, radial and ulnar pulses normal \par \par Left Upper Extremity\par o Shoulder :\par ¦ Inspection/Palpation : mild tenderness over the greater tuberosity and over the proximal biceps tendon, no swelling, no deformities\par ¦ Range of Motion : ACTIVE FORWARD ELEVATION: Measured at 120 degrees, ACTIVE EXTERNAL ROTATION: Measured at 60 degrees, ACTIVE INTERNAL ROTATION: Measured at T12, ACTIVE ABDUCTION: measured at 120 degrees \par ¦ Strength : external rotation 5/5, internal rotation 5/5, supraspinatus 5/5 all with pain\par ¦ Stability : no joint instability on provocative testing\par ¦ Tests/Signs : Neer (+ mild), Abbott (+), Speed’s Test (+) \par o Upper Arm : no tenderness, no swelling, no deformities\par o Muscle Bulk : no atrophy\par o Sensation : sensation intact to light touch\par o Skin : no skin rash or discoloration\par o Vascular Exam : no edema, no cyanosis, radial and ulnar pulses normal  [de-identified] : o Left Humerus : AP and lateral views were obtained, there are no soft tissue abnormalities, no fractures, alignment is normal, normal appearing joint spaces, normal bone density, no bony lesions.\par \par

## 2020-10-29 NOTE — DISCUSSION/SUMMARY
[de-identified] : The underlying pathophysiology was reviewed in great detail with the patient as well as the various treatment options, including ice, analgesics, NSAIDs, Physical therapy, steroid injections. \par \par Continue physical therapy as prescribed. A prescription for Physical Therapy was provided.\par \par Activity modifications and restrictions were discussed. I advised avoiding overhead lifting. I advised the patient to work on good posture. \par \par FU 6-8 weeks.\par \par All questions were answered, all alternatives discussed and the patient is in complete agreement with that plan. Follow-up appointment as instructed. Any issues and the patient will call or come in sooner.

## 2020-11-12 ENCOUNTER — APPOINTMENT (OUTPATIENT)
Dept: FAMILY MEDICINE | Facility: CLINIC | Age: 45
End: 2020-11-12
Payer: MEDICARE

## 2020-11-12 DIAGNOSIS — N91.2 AMENORRHEA, UNSPECIFIED: ICD-10-CM

## 2020-11-12 PROCEDURE — 96372 THER/PROPH/DIAG INJ SC/IM: CPT

## 2020-11-12 RX ORDER — CYANOCOBALAMIN 1000 UG/ML
1000 INJECTION INTRAMUSCULAR; SUBCUTANEOUS
Qty: 0 | Refills: 0 | Status: COMPLETED | OUTPATIENT
Start: 2020-11-12

## 2020-11-12 RX ADMIN — CYANOCOBALAMIN 0 MCG/ML: 1000 INJECTION INTRAMUSCULAR; SUBCUTANEOUS at 00:00

## 2020-11-13 LAB
HCG SERPL QL: NEGATIVE
PAPP-A SERPL-ACNC: <1 MIU/ML

## 2020-12-10 ENCOUNTER — APPOINTMENT (OUTPATIENT)
Dept: FAMILY MEDICINE | Facility: CLINIC | Age: 45
End: 2020-12-10
Payer: MEDICARE

## 2020-12-10 PROCEDURE — 96372 THER/PROPH/DIAG INJ SC/IM: CPT

## 2020-12-10 RX ORDER — CYANOCOBALAMIN 1000 UG/ML
1000 INJECTION INTRAMUSCULAR; SUBCUTANEOUS
Qty: 0 | Refills: 0 | Status: COMPLETED | OUTPATIENT
Start: 2020-12-10

## 2020-12-10 RX ADMIN — CYANOCOBALAMIN 0 MCG/ML: 1000 INJECTION INTRAMUSCULAR; SUBCUTANEOUS at 00:00

## 2020-12-15 PROBLEM — Z87.09 HISTORY OF ACUTE PHARYNGITIS: Status: RESOLVED | Noted: 2017-05-11 | Resolved: 2020-12-15

## 2020-12-16 PROBLEM — Z87.09 HISTORY OF ACUTE SINUSITIS: Status: RESOLVED | Noted: 2018-12-27 | Resolved: 2020-12-16

## 2020-12-21 ENCOUNTER — APPOINTMENT (OUTPATIENT)
Dept: ORTHOPEDIC SURGERY | Facility: CLINIC | Age: 45
End: 2020-12-21
Payer: MEDICARE

## 2020-12-21 VITALS
DIASTOLIC BLOOD PRESSURE: 74 MMHG | SYSTOLIC BLOOD PRESSURE: 118 MMHG | BODY MASS INDEX: 42.44 KG/M2 | HEART RATE: 76 BPM | WEIGHT: 280 LBS | HEIGHT: 68 IN

## 2020-12-21 PROCEDURE — 99213 OFFICE O/P EST LOW 20 MIN: CPT

## 2020-12-21 NOTE — PHYSICAL EXAM
[Cane] : ambulates with cane [Normal RUE] : Right Upper Extremity: No scars, rashes, lesions, ulcers, skin intact [Normal LUE] : Left Upper Extremity: No scars, rashes, lesions, ulcers, skin intact [Obese] : obese [Poor Appearance] : well-appearing [Acute Distress] : not in acute distress [de-identified] : Cervical Spine/Neck\par Inspection/Palpation :\par ¦ Inspection : alignment midline, normal degree of lordosis present, thoracic kyphosis noted\par ¦ Skin : normal appearance, no masses, no tenderness, trachea midline, \par ¦ Palpation : musculature is nontender to palpation\par ¦ Tests and Signs : Spurling’s (+ left), Lhermitte’s (-)\par ¦ Range of Motion : arc of motion full in all planes, no crepitus,  pain with left rotation \par ¦ Stability : no subluxations or other evidence of instability demonstrated during range of motion testing\par o Muscle Strength : paraspinal muscle strength within normal limits\par o Muscle Tone : paraspinal muscle tone within normal limits\par o Muscle Bulk : normal, no atrophy\par o Cervical Lymph Nodes : no lymphadenopathy present\par ¦ Upper Extremity Strength : elbow flexion and extension 5/5, wrist extension, flexion, ulnar deviation and radial deviation 5/5, all intrinsic and extrinsic hand muscles 5/5\par o Special Tests: Lucas’s Reflex (-) \par  \par Right Upper Extremity\par o Shoulder :\par ¦ Inspection/Palpation :no tenderness, no swelling, no deformities\par ¦ Range of Motion : ACTIVE FORWARD ELEVATION: Measured at 150 degrees, ACTIVE EXTERNAL ROTATION: Measured at 65 degrees, ACTIVE INTERNAL ROTATION: Measured at L5, ACTIVE  ABDUCTION: Measured at 130 degrees \par ¦ Strength : external rotation 5/5, internal rotation 5/5, supraspinatus 5/5 \par ¦ Stability : no joint instability on provocative testing\par ¦ Tests/Signs : Neer (-), Abbott (+), Speed’s Test (-) \par o Upper Arm : no tenderness, no swelling, no deformities\par o Muscle Bulk : no atrophy\par o Sensation : sensation intact to light touch\par o Skin : no skin rash or discoloration\par o Vascular Exam : no edema, no cyanosis, radial and ulnar pulses normal \par \par Left Upper Extremity\par o Shoulder :\par ¦ Inspection/Palpation : mild tenderness over the greater tuberosity and over the proximal biceps tendon, no swelling, no deformities\par ¦ Range of Motion : ACTIVE FORWARD ELEVATION: Measured at 120 degrees, ACTIVE EXTERNAL ROTATION: Measured at 60 degrees, ACTIVE INTERNAL ROTATION: Measured at T12, ACTIVE ABDUCTION: measured at 120 degrees \par ¦ Strength : external rotation 5/5, internal rotation 5/5, supraspinatus 5/5 all with pain\par ¦ Stability : no joint instability on provocative testing\par ¦ Tests/Signs : Neer (+ mild), Abbott (+), Speed’s Test (+) \par o Upper Arm : no tenderness, no swelling, no deformities\par o Muscle Bulk : no atrophy\par o Sensation : sensation intact to light touch\par o Skin : no skin rash or discoloration\par o Vascular Exam : no edema, no cyanosis, radial and ulnar pulses normal
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
## 2020-12-21 NOTE — DISCUSSION/SUMMARY
[de-identified] : The underlying pathophysiology was reviewed in great detail with the patient as well as the various treatment options, including ice, analgesics, NSAIDs, Physical therapy, steroid injections. \par \par Continue physical therapy as prescribed. A prescription for Physical Therapy was provided.\par \par Activity modifications and restrictions were discussed. I advised avoiding overhead lifting. I advised the patient to work on good posture. \par \par FU 6-8 weeks.\par \par All questions were answered, all alternatives discussed and the patient is in complete agreement with that plan. Follow-up appointment as instructed. Any issues and the patient will call or come in sooner.

## 2020-12-23 PROBLEM — Z86.19 HISTORY OF CANDIDIASIS OF VAGINA: Status: RESOLVED | Noted: 2017-09-05 | Resolved: 2020-12-23

## 2021-01-11 ENCOUNTER — RX RENEWAL (OUTPATIENT)
Age: 46
End: 2021-01-11

## 2021-02-08 ENCOUNTER — RX RENEWAL (OUTPATIENT)
Age: 46
End: 2021-02-08

## 2021-02-08 ENCOUNTER — APPOINTMENT (OUTPATIENT)
Dept: FAMILY MEDICINE | Facility: CLINIC | Age: 46
End: 2021-02-08
Payer: MEDICARE

## 2021-02-08 VITALS — SYSTOLIC BLOOD PRESSURE: 115 MMHG | HEART RATE: 76 BPM | RESPIRATION RATE: 20 BRPM | DIASTOLIC BLOOD PRESSURE: 70 MMHG

## 2021-02-08 PROCEDURE — 99214 OFFICE O/P EST MOD 30 MIN: CPT | Mod: 25

## 2021-02-08 PROCEDURE — 36415 COLL VENOUS BLD VENIPUNCTURE: CPT

## 2021-02-08 PROCEDURE — 96372 THER/PROPH/DIAG INJ SC/IM: CPT

## 2021-02-08 RX ORDER — ARIPIPRAZOLE 5 MG/1
5 TABLET ORAL
Qty: 30 | Refills: 0 | Status: ACTIVE | COMMUNITY
Start: 2020-10-22

## 2021-02-08 RX ORDER — CALCIPOTRIENE 50 UG/G
0.01 CREAM TOPICAL
Qty: 360 | Refills: 0 | Status: ACTIVE | COMMUNITY
Start: 2020-09-01

## 2021-02-08 RX ORDER — CYANOCOBALAMIN 1000 UG/ML
1000 INJECTION INTRAMUSCULAR; SUBCUTANEOUS
Qty: 0 | Refills: 0 | Status: COMPLETED | OUTPATIENT
Start: 2021-02-08

## 2021-02-08 RX ADMIN — CYANOCOBALAMIN 0 MCG/ML: 1000 INJECTION INTRAMUSCULAR; SUBCUTANEOUS at 00:00

## 2021-02-08 NOTE — PHYSICAL EXAM
[No Acute Distress] : no acute distress [Normal] : normal rate, regular rhythm, normal S1 and S2 and no murmur heard [No Edema] : there was no peripheral edema [Soft] : abdomen soft [Non Tender] : non-tender [Normal Posterior Cervical Nodes] : no posterior cervical lymphadenopathy [Normal Anterior Cervical Nodes] : no anterior cervical lymphadenopathy [Muscle Spasms, Bilateral] : bilateral muscle spasms [Motor Strength Lower Extremities Bilaterally] : there was weakness in both lower extremities [Limited Balance] : the patient's balance was impaired [Speech Grossly Normal] : speech grossly normal [Memory Grossly Normal] : memory grossly normal [Normal Affect] : the affect was normal [Alert and Oriented x3] : oriented to person, place, and time [Normal Mood] : the mood was normal [Normal Insight/Judgement] : insight and judgment were intact [de-identified] : multiple joint deformities; using cane for assistance in ambulation

## 2021-02-08 NOTE — ASSESSMENT
[FreeTextEntry1] : Hemodynamically stable with acceptable BP\par Asthma well managed at this time\par Musculoskeletal findings consistent with history - continue proper use of all medication\par Lab profiles drawn in office and sent\par B12 1ml IM given R deltoid

## 2021-02-08 NOTE — REVIEW OF SYSTEMS
[Joint Pain] : joint pain [Muscle Weakness] : muscle weakness [Unsteady Walking] : ataxia [Negative] : Psychiatric

## 2021-02-09 LAB
25(OH)D3 SERPL-MCNC: 49.4 NG/ML
ALBUMIN SERPL ELPH-MCNC: 4.3 G/DL
ALP BLD-CCNC: 81 U/L
ALT SERPL-CCNC: 7 U/L
ANION GAP SERPL CALC-SCNC: 12 MMOL/L
AST SERPL-CCNC: 7 U/L
BASOPHILS # BLD AUTO: 0.05 K/UL
BASOPHILS NFR BLD AUTO: 0.8 %
BILIRUB SERPL-MCNC: 0.2 MG/DL
BUN SERPL-MCNC: 10 MG/DL
CALCIUM SERPL-MCNC: 9 MG/DL
CHLORIDE SERPL-SCNC: 109 MMOL/L
CHOLEST SERPL-MCNC: 120 MG/DL
CO2 SERPL-SCNC: 22 MMOL/L
CREAT SERPL-MCNC: 0.99 MG/DL
EOSINOPHIL # BLD AUTO: 0.15 K/UL
EOSINOPHIL NFR BLD AUTO: 2.3 %
ESTIMATED AVERAGE GLUCOSE: 111 MG/DL
FOLATE SERPL-MCNC: 7.3 NG/ML
GLUCOSE SERPL-MCNC: 103 MG/DL
HBA1C MFR BLD HPLC: 5.5 %
HCT VFR BLD CALC: 37.9 %
HDLC SERPL-MCNC: 43 MG/DL
HGB BLD-MCNC: 11.7 G/DL
IMM GRANULOCYTES NFR BLD AUTO: 0.2 %
LDLC SERPL CALC-MCNC: 66 MG/DL
LYMPHOCYTES # BLD AUTO: 2.23 K/UL
LYMPHOCYTES NFR BLD AUTO: 33.5 %
MAN DIFF?: NORMAL
MCHC RBC-ENTMCNC: 26.9 PG
MCHC RBC-ENTMCNC: 30.9 GM/DL
MCV RBC AUTO: 87.1 FL
MONOCYTES # BLD AUTO: 0.43 K/UL
MONOCYTES NFR BLD AUTO: 6.5 %
NEUTROPHILS # BLD AUTO: 3.78 K/UL
NEUTROPHILS NFR BLD AUTO: 56.7 %
NONHDLC SERPL-MCNC: 78 MG/DL
PLATELET # BLD AUTO: 328 K/UL
POTASSIUM SERPL-SCNC: 3.6 MMOL/L
PROT SERPL-MCNC: 6.3 G/DL
RBC # BLD: 4.35 M/UL
RBC # FLD: 14 %
SODIUM SERPL-SCNC: 143 MMOL/L
T4 FREE SERPL-MCNC: 1 NG/DL
TRIGL SERPL-MCNC: 57 MG/DL
TSH SERPL-ACNC: 1.56 UIU/ML
VIT B12 SERPL-MCNC: 330 PG/ML
WBC # FLD AUTO: 6.65 K/UL

## 2021-02-19 ENCOUNTER — RX RENEWAL (OUTPATIENT)
Age: 46
End: 2021-02-19

## 2021-02-22 ENCOUNTER — APPOINTMENT (OUTPATIENT)
Dept: ORTHOPEDIC SURGERY | Facility: CLINIC | Age: 46
End: 2021-02-22
Payer: MEDICARE

## 2021-02-22 VITALS — BODY MASS INDEX: 42.44 KG/M2 | WEIGHT: 280 LBS | HEIGHT: 68 IN

## 2021-02-22 PROCEDURE — 99213 OFFICE O/P EST LOW 20 MIN: CPT

## 2021-02-22 NOTE — DISCUSSION/SUMMARY
[de-identified] : The underlying pathophysiology was reviewed in great detail with the patient as well as the various treatment options, including ice, analgesics, NSAIDs, Physical therapy, steroid injections. \par \par Continue physical therapy as prescribed. A prescription for Physical Therapy was provided.\par \par Activity modifications and restrictions were discussed. I advised avoiding overhead lifting. I advised the patient to work on good posture. \par \par FU 6-8 weeks.\par \par All questions were answered, all alternatives discussed and the patient is in complete agreement with that plan. Follow-up appointment as instructed. Any issues and the patient will call or come in sooner.
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
## 2021-02-22 NOTE — PHYSICAL EXAM
[Cane] : ambulates with cane [Normal RUE] : Right Upper Extremity: No scars, rashes, lesions, ulcers, skin intact [Normal LUE] : Left Upper Extremity: No scars, rashes, lesions, ulcers, skin intact [Obese] : obese [Poor Appearance] : well-appearing [Acute Distress] : not in acute distress [de-identified] : Cervical Spine/Neck\par Inspection/Palpation :\par ¦ Inspection : alignment midline, normal degree of lordosis present, thoracic kyphosis noted\par ¦ Skin : normal appearance, no masses, no tenderness, trachea midline, \par ¦ Palpation : left paraspinal and trapezius musculature is mildly tender to palpation\par ¦ Tests and Signs : Spurling’s (-), Lhermitte’s (-)\par ¦ Range of Motion : arc of motion full in all planes, no crepitus,  pain with left rotation \par ¦ Stability : no subluxations or other evidence of instability demonstrated during range of motion testing\par o Muscle Strength : paraspinal muscle strength within normal limits\par o Muscle Tone : paraspinal muscle tone within normal limits\par o Muscle Bulk : normal, no atrophy\par o Cervical Lymph Nodes : no lymphadenopathy present\par ¦ Upper Extremity Strength : elbow flexion and extension 5/5, wrist extension, flexion, ulnar deviation and radial deviation 5/5, all intrinsic and extrinsic hand muscles 5/5\par o Special Tests: Lucas’s Reflex (-) \par  \par Right Upper Extremity\par o Shoulder :\par ¦ Inspection/Palpation :no tenderness, no swelling, no deformities\par ¦ Range of Motion : ACTIVE FORWARD ELEVATION: Measured at 170 degrees, ACTIVE EXTERNAL ROTATION: Measured at 75 degrees, ACTIVE INTERNAL ROTATION: Measured at T10, ACTIVE  ABDUCTION: Measured at 160 degrees \par ¦ Strength : external rotation 5/5, internal rotation 5/5, supraspinatus 5/5 \par ¦ Stability : no joint instability on provocative testing\par ¦ Tests/Signs : Neer (-), Abbott (+), Speed’s Test (-) \par o Upper Arm : no tenderness, no swelling, no deformities\par o Muscle Bulk : no atrophy\par o Sensation : sensation intact to light touch\par o Skin : no skin rash or discoloration\par o Vascular Exam : no edema, no cyanosis, radial and ulnar pulses normal \par \par Left Upper Extremity\par o Shoulder :\par ¦ Inspection/Palpation : mild tenderness over the greater tuberosity and over the proximal biceps tendon, no swelling, no deformities\par ¦ Range of Motion : ACTIVE FORWARD ELEVATION: Measured at 170 degrees, ACTIVE EXTERNAL ROTATION: Measured at 75 degrees, ACTIVE INTERNAL ROTATION: Measured at T6, ACTIVE ABDUCTION: measured at 160 degrees \par ¦ Strength : external rotation 5/5, internal rotation 5/5, supraspinatus 5/5 all with pain\par ¦ Stability : no joint instability on provocative testing\par ¦ Tests/Signs : Neer (+), Abbott (+), Speed’s Test (+) \par o Upper Arm : no tenderness, no swelling, no deformities\par o Muscle Bulk : no atrophy\par o Sensation : sensation intact to light touch\par o Skin : no skin rash or discoloration\par o Vascular Exam : no edema, no cyanosis, radial and ulnar pulses normal

## 2021-03-12 ENCOUNTER — APPOINTMENT (OUTPATIENT)
Dept: FAMILY MEDICINE | Facility: CLINIC | Age: 46
End: 2021-03-12
Payer: MEDICARE

## 2021-03-12 PROCEDURE — 96372 THER/PROPH/DIAG INJ SC/IM: CPT

## 2021-03-12 RX ORDER — CYANOCOBALAMIN 1000 UG/ML
1000 INJECTION INTRAMUSCULAR; SUBCUTANEOUS
Qty: 0 | Refills: 0 | Status: COMPLETED | OUTPATIENT
Start: 2021-03-12

## 2021-03-12 RX ADMIN — CYANOCOBALAMIN 0 MCG/ML: 1000 INJECTION INTRAMUSCULAR; SUBCUTANEOUS at 00:00

## 2021-03-26 ENCOUNTER — APPOINTMENT (OUTPATIENT)
Dept: SURGERY | Facility: CLINIC | Age: 46
End: 2021-03-26
Payer: MEDICARE

## 2021-03-26 VITALS — BODY MASS INDEX: 42.44 KG/M2 | WEIGHT: 280 LBS | HEIGHT: 68 IN | TEMPERATURE: 97.8 F

## 2021-03-26 DIAGNOSIS — K64.9 UNSPECIFIED HEMORRHOIDS: ICD-10-CM

## 2021-03-26 DIAGNOSIS — J18.9 PNEUMONIA, UNSPECIFIED ORGANISM: ICD-10-CM

## 2021-03-26 DIAGNOSIS — Z12.11 ENCOUNTER FOR SCREENING FOR MALIGNANT NEOPLASM OF COLON: ICD-10-CM

## 2021-03-26 DIAGNOSIS — I49.9 CARDIAC ARRHYTHMIA, UNSPECIFIED: ICD-10-CM

## 2021-03-26 DIAGNOSIS — Z12.12 ENCOUNTER FOR SCREENING FOR MALIGNANT NEOPLASM OF COLON: ICD-10-CM

## 2021-03-26 PROCEDURE — 99203 OFFICE O/P NEW LOW 30 MIN: CPT

## 2021-03-27 PROBLEM — K64.9 HEMORRHOIDS: Status: ACTIVE | Noted: 2020-05-18

## 2021-03-27 NOTE — REASON FOR VISIT
[Initial Evaluation] : an initial evaluation [FreeTextEntry1] : anal pain; BRBPR; to discuss undergoing a colonoscopy

## 2021-03-27 NOTE — ASSESSMENT
[FreeTextEntry1] : Long discussion regarding all options and risks\par Long discussion regarding hemorrhoids\par To undergo colonoscopy on 3/30/21\par Discussion about high fiber diets and metamucil\par Discussion with medical doctor regarding plans

## 2021-03-27 NOTE — HISTORY OF PRESENT ILLNESS
[de-identified] : This 46 year old woman has been suffering from anal pain and passage of BRBPR following passage of stool. The patient has significant back problems which requires frequent doses of percocet. The patient has never undergone a colonoscopy. There is no FH of colon CA.

## 2021-03-27 NOTE — PHYSICAL EXAM
[Normal Breath Sounds] : Normal breath sounds [Normal Heart Sounds] : normal heart sounds [Normal Rate and Rhythm] : normal rate and rhythm [Abdominal Masses] : No abdominal masses [Abdomen Tenderness] : ~T ~M No abdominal tenderness [No Rash or Lesion] : No rash or lesion [de-identified] : nl [de-identified] : nl [de-identified] : redundant anal skin; fissure; prolapsed hemorrhoids [de-identified] : nl

## 2021-03-29 ENCOUNTER — TRANSCRIPTION ENCOUNTER (OUTPATIENT)
Age: 46
End: 2021-03-29

## 2021-03-29 ENCOUNTER — OUTPATIENT (OUTPATIENT)
Dept: OUTPATIENT SERVICES | Facility: HOSPITAL | Age: 46
LOS: 1 days | End: 2021-03-29
Payer: MEDICARE

## 2021-03-29 DIAGNOSIS — Z20.828 CONTACT WITH AND (SUSPECTED) EXPOSURE TO OTHER VIRAL COMMUNICABLE DISEASES: ICD-10-CM

## 2021-03-29 LAB — SARS-COV-2 RNA SPEC QL NAA+PROBE: SIGNIFICANT CHANGE UP

## 2021-03-29 PROCEDURE — U0005: CPT

## 2021-03-29 PROCEDURE — U0003: CPT

## 2021-03-30 ENCOUNTER — OUTPATIENT (OUTPATIENT)
Dept: OUTPATIENT SERVICES | Facility: HOSPITAL | Age: 46
LOS: 1 days | End: 2021-03-30
Payer: MEDICARE

## 2021-03-30 ENCOUNTER — RESULT REVIEW (OUTPATIENT)
Age: 46
End: 2021-03-30

## 2021-03-30 DIAGNOSIS — Z12.11 ENCOUNTER FOR SCREENING FOR MALIGNANT NEOPLASM OF COLON: ICD-10-CM

## 2021-03-30 DIAGNOSIS — K59.00 CONSTIPATION, UNSPECIFIED: ICD-10-CM

## 2021-03-30 DIAGNOSIS — K62.5 HEMORRHAGE OF ANUS AND RECTUM: ICD-10-CM

## 2021-03-30 PROCEDURE — 45385 COLONOSCOPY W/LESION REMOVAL: CPT

## 2021-03-30 PROCEDURE — 88305 TISSUE EXAM BY PATHOLOGIST: CPT | Mod: 26

## 2021-03-30 PROCEDURE — 88305 TISSUE EXAM BY PATHOLOGIST: CPT

## 2021-03-30 RX ORDER — SODIUM CHLORIDE 9 MG/ML
500 INJECTION INTRAMUSCULAR; INTRAVENOUS; SUBCUTANEOUS
Refills: 0 | Status: DISCONTINUED | OUTPATIENT
Start: 2021-03-30 | End: 2021-04-14

## 2021-04-01 LAB — SURGICAL PATHOLOGY STUDY: SIGNIFICANT CHANGE UP

## 2021-04-12 ENCOUNTER — APPOINTMENT (OUTPATIENT)
Dept: FAMILY MEDICINE | Facility: CLINIC | Age: 46
End: 2021-04-12

## 2021-04-19 ENCOUNTER — APPOINTMENT (OUTPATIENT)
Dept: SURGERY | Facility: CLINIC | Age: 46
End: 2021-04-19
Payer: MEDICARE

## 2021-04-19 ENCOUNTER — APPOINTMENT (OUTPATIENT)
Dept: ORTHOPEDIC SURGERY | Facility: CLINIC | Age: 46
End: 2021-04-19
Payer: MEDICARE

## 2021-04-19 VITALS — BODY MASS INDEX: 42.44 KG/M2 | WEIGHT: 280 LBS | HEIGHT: 68 IN

## 2021-04-19 VITALS — TEMPERATURE: 97.4 F

## 2021-04-19 DIAGNOSIS — Z86.59 PERSONAL HISTORY OF OTHER MENTAL AND BEHAVIORAL DISORDERS: ICD-10-CM

## 2021-04-19 DIAGNOSIS — Z86.79 PERSONAL HISTORY OF OTHER DISEASES OF THE CIRCULATORY SYSTEM: ICD-10-CM

## 2021-04-19 DIAGNOSIS — K59.00 CONSTIPATION, UNSPECIFIED: ICD-10-CM

## 2021-04-19 DIAGNOSIS — K60.2 ANAL FISSURE, UNSPECIFIED: ICD-10-CM

## 2021-04-19 DIAGNOSIS — Z82.49 FAMILY HISTORY OF ISCHEMIC HEART DISEASE AND OTHER DISEASES OF THE CIRCULATORY SYSTEM: ICD-10-CM

## 2021-04-19 PROCEDURE — 99214 OFFICE O/P EST MOD 30 MIN: CPT | Mod: 24

## 2021-04-19 PROCEDURE — 99213 OFFICE O/P EST LOW 20 MIN: CPT
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
## 2021-04-19 NOTE — DISCUSSION/SUMMARY
[de-identified] : The underlying pathophysiology was reviewed in great detail with the patient as well as the various treatment options, including ice, analgesics, NSAIDs, Physical therapy, steroid injections. \par \par Continue physical therapy as prescribed. A prescription for Physical Therapy was provided.\par \par Activity modifications and restrictions were discussed. I advised avoiding overhead lifting. I advised the patient to work on good posture. \par \par FU 6-8 weeks.\par \par All questions were answered, all alternatives discussed and the patient is in complete agreement with that plan. Follow-up appointment as instructed. Any issues and the patient will call or come in sooner.

## 2021-04-20 PROBLEM — K60.2 ANAL FISSURE: Status: ACTIVE | Noted: 2020-05-18

## 2021-04-20 NOTE — PHYSICAL EXAM
[Normal Breath Sounds] : Normal breath sounds [Normal Heart Sounds] : normal heart sounds [Normal Rate and Rhythm] : normal rate and rhythm [Abdominal Masses] : No abdominal masses [Abdomen Tenderness] : ~T ~M No abdominal tenderness [No Rash or Lesion] : No rash or lesion [de-identified] : nl [de-identified] : nl [de-identified] : healing fissure [de-identified] : nl

## 2021-04-20 NOTE — REVIEW OF SYSTEMS
[Heart Rate Is Slow] : the heart rate was not slow [Shortness Of Breath] : no shortness of breath [Abdominal Pain] : no abdominal pain [Constipation] : no constipation [Negative] : Musculoskeletal

## 2021-04-20 NOTE — HISTORY OF PRESENT ILLNESS
[de-identified] : The patient is feeling much improved since the colonoscopy. The anal pain is no longer present , and the rectal bleeding is much less. She has been eating well and passing soft stool. The patient denies fever or chills.

## 2021-04-20 NOTE — ASSESSMENT
[FreeTextEntry1] : Long discussion regarding all options and risks\par To continue Metamucil and sitz baths\par To avoid narcotics for back problems\par To return in two months if does not continue to improve\par Discussion eith medical doctor

## 2021-05-10 ENCOUNTER — APPOINTMENT (OUTPATIENT)
Dept: FAMILY MEDICINE | Facility: CLINIC | Age: 46
End: 2021-05-10
Payer: MEDICARE

## 2021-05-10 VITALS — DIASTOLIC BLOOD PRESSURE: 74 MMHG | RESPIRATION RATE: 20 BRPM | HEART RATE: 76 BPM | SYSTOLIC BLOOD PRESSURE: 128 MMHG

## 2021-05-10 PROCEDURE — 36415 COLL VENOUS BLD VENIPUNCTURE: CPT

## 2021-05-10 PROCEDURE — 96372 THER/PROPH/DIAG INJ SC/IM: CPT

## 2021-05-10 PROCEDURE — 99214 OFFICE O/P EST MOD 30 MIN: CPT | Mod: 25

## 2021-05-10 RX ORDER — CYANOCOBALAMIN 1000 UG/ML
1000 INJECTION INTRAMUSCULAR; SUBCUTANEOUS
Qty: 0 | Refills: 0 | Status: COMPLETED | OUTPATIENT
Start: 2021-05-10

## 2021-05-10 RX ADMIN — CYANOCOBALAMIN 0 MCG/ML: 1000 INJECTION INTRAMUSCULAR; SUBCUTANEOUS at 00:00

## 2021-05-10 NOTE — ASSESSMENT
[FreeTextEntry1] : Hemodynamically stable with acceptable BP\par Asthma stable at present\par Neuromuscular findings consistent with history - continue proper use of all medication\par Lab profiles drawn in office and sent\par B12 1ml IM given L deltoid

## 2021-05-10 NOTE — HISTORY OF PRESENT ILLNESS
[de-identified] : Presents for BP check, labs, and general follow-up.  Also due for B12 injection.  States trying to watch diet but is not self-checking.  Reviewed medication - note has chronic back issues affecting mobility - using cane - pain mgt is effective in maintaining good daily functioning and QOL with no evidence of intoxication or euphoria.  Otherwise denies new breathing issues.

## 2021-05-10 NOTE — REVIEW OF SYSTEMS
[Back Pain] : back pain [Unsteady Walking] : ataxia [Negative] : Psychiatric [FreeTextEntry9] : consistent with history [de-identified] : consistent with history

## 2021-05-10 NOTE — PHYSICAL EXAM
[No Acute Distress] : no acute distress [Normal] : normal rate, regular rhythm, normal S1 and S2 and no murmur heard [No Edema] : there was no peripheral edema [Soft] : abdomen soft [Non Tender] : non-tender [Normal Posterior Cervical Nodes] : no posterior cervical lymphadenopathy [Normal Anterior Cervical Nodes] : no anterior cervical lymphadenopathy [Muscle Spasms, Bilateral] : bilateral muscle spasms [Motor Strength Lower Extremities Bilaterally] : there was weakness in both lower extremities [No Focal Deficits] : no focal deficits [Limited Balance] : the patient's balance was impaired [Speech Grossly Normal] : speech grossly normal [Memory Grossly Normal] : memory grossly normal [Normal Affect] : the affect was normal [Alert and Oriented x3] : oriented to person, place, and time [Normal Mood] : the mood was normal [Normal Insight/Judgement] : insight and judgment were intact

## 2021-05-11 LAB
25(OH)D3 SERPL-MCNC: 39.7 NG/ML
ALBUMIN SERPL ELPH-MCNC: 4.6 G/DL
ALP BLD-CCNC: 80 U/L
ALT SERPL-CCNC: 7 U/L
ANION GAP SERPL CALC-SCNC: 9 MMOL/L
AST SERPL-CCNC: 11 U/L
BASOPHILS # BLD AUTO: 0.04 K/UL
BASOPHILS NFR BLD AUTO: 0.7 %
BILIRUB SERPL-MCNC: 0.3 MG/DL
BUN SERPL-MCNC: 7 MG/DL
CALCIUM SERPL-MCNC: 9.1 MG/DL
CHLORIDE SERPL-SCNC: 108 MMOL/L
CHOLEST SERPL-MCNC: 137 MG/DL
CO2 SERPL-SCNC: 25 MMOL/L
CREAT SERPL-MCNC: 0.89 MG/DL
EOSINOPHIL # BLD AUTO: 0.15 K/UL
EOSINOPHIL NFR BLD AUTO: 2.5 %
ESTIMATED AVERAGE GLUCOSE: 111 MG/DL
GLUCOSE SERPL-MCNC: 114 MG/DL
HBA1C MFR BLD HPLC: 5.5 %
HCT VFR BLD CALC: 38.6 %
HDLC SERPL-MCNC: 50 MG/DL
HGB BLD-MCNC: 11.9 G/DL
IMM GRANULOCYTES NFR BLD AUTO: 0.3 %
LDLC SERPL CALC-MCNC: 74 MG/DL
LYMPHOCYTES # BLD AUTO: 2.06 K/UL
LYMPHOCYTES NFR BLD AUTO: 34 %
MAN DIFF?: NORMAL
MCHC RBC-ENTMCNC: 26.7 PG
MCHC RBC-ENTMCNC: 30.8 GM/DL
MCV RBC AUTO: 86.7 FL
MONOCYTES # BLD AUTO: 0.47 K/UL
MONOCYTES NFR BLD AUTO: 7.8 %
NEUTROPHILS # BLD AUTO: 3.31 K/UL
NEUTROPHILS NFR BLD AUTO: 54.7 %
NONHDLC SERPL-MCNC: 87 MG/DL
PLATELET # BLD AUTO: 328 K/UL
POTASSIUM SERPL-SCNC: 3.9 MMOL/L
PROT SERPL-MCNC: 6.7 G/DL
RBC # BLD: 4.45 M/UL
RBC # FLD: 14.6 %
SODIUM SERPL-SCNC: 141 MMOL/L
TRIGL SERPL-MCNC: 65 MG/DL
VIT B12 SERPL-MCNC: 346 PG/ML
WBC # FLD AUTO: 6.05 K/UL

## 2021-06-12 ENCOUNTER — RX RENEWAL (OUTPATIENT)
Age: 46
End: 2021-06-12

## 2021-06-14 ENCOUNTER — APPOINTMENT (OUTPATIENT)
Dept: FAMILY MEDICINE | Facility: CLINIC | Age: 46
End: 2021-06-14
Payer: MEDICARE

## 2021-06-17 ENCOUNTER — APPOINTMENT (OUTPATIENT)
Dept: ORTHOPEDIC SURGERY | Facility: CLINIC | Age: 46
End: 2021-06-17
Payer: MEDICARE

## 2021-06-17 VITALS — WEIGHT: 280 LBS | BODY MASS INDEX: 42.44 KG/M2 | HEIGHT: 68 IN

## 2021-06-17 DIAGNOSIS — S13.9XXA SPRAIN OF JOINTS AND LIGAMENTS OF UNSPECIFIED PARTS OF NECK, INITIAL ENCOUNTER: ICD-10-CM

## 2021-06-17 DIAGNOSIS — M19.019 PRIMARY OSTEOARTHRITIS, UNSPECIFIED SHOULDER: ICD-10-CM

## 2021-06-17 DIAGNOSIS — S40.012D CONTUSION OF LEFT SHOULDER, SUBSEQUENT ENCOUNTER: ICD-10-CM

## 2021-06-17 PROCEDURE — 99214 OFFICE O/P EST MOD 30 MIN: CPT

## 2021-06-17 PROCEDURE — 73564 X-RAY EXAM KNEE 4 OR MORE: CPT | Mod: LT
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
## 2021-06-17 NOTE — PHYSICAL EXAM
[Cane] : ambulates with cane [Normal RUE] : Right Upper Extremity: No scars, rashes, lesions, ulcers, skin intact [Normal LUE] : Left Upper Extremity: No scars, rashes, lesions, ulcers, skin intact [Obese] : obese [Poor Appearance] : well-appearing [Acute Distress] : not in acute distress [de-identified] : Cervical Spine/Neck\par Inspection/Palpation :\par ¦ Inspection : alignment midline, normal degree of lordosis present, thoracic kyphosis noted\par ¦ Skin : normal appearance, no masses, no tenderness, trachea midline, \par ¦ Palpation : left paraspinal and trapezius musculature is mildly tender to palpation with palpable tightness noted. \par ¦ Tests and Signs : Spurling’s (-), Lhermitte’s (-)\par ¦ Range of Motion : arc of motion full in all planes, no crepitus,  pain with left rotation \par ¦ Stability : no subluxations or other evidence of instability demonstrated during range of motion testing\par o Muscle Strength : paraspinal muscle strength within normal limits\par o Muscle Tone : paraspinal muscle tone within normal limits\par o Muscle Bulk : normal, no atrophy\par o Cervical Lymph Nodes : no lymphadenopathy present\par ¦ Upper Extremity Strength : elbow flexion and extension 5/5, wrist extension, flexion, ulnar deviation and radial deviation 5/5, all intrinsic and extrinsic hand muscles 5/5\par o Special Tests: Lucas’s Reflex (-) \par  \par Right Upper Extremity\par o Shoulder :\par ¦ Inspection/Palpation :no tenderness, no swelling, no deformities\par ¦ Range of Motion : ACTIVE FORWARD ELEVATION: Measured at 170 degrees, ACTIVE EXTERNAL ROTATION: Measured at 75 degrees, ACTIVE INTERNAL ROTATION: Measured at T9, ACTIVE  ABDUCTION: Measured at 160 degrees \par ¦ Strength : external rotation 5/5, internal rotation 5/5, supraspinatus 5/5 \par ¦ Stability : no joint instability on provocative testing\par ¦ Tests/Signs : Neer (-), Abbott (+), Speed’s Test (-) \par o Upper Arm : no tenderness, no swelling, no deformities\par o Muscle Bulk : no atrophy\par o Sensation : sensation intact to light touch\par o Skin : no skin rash or discoloration\par o Vascular Exam : no edema, no cyanosis, radial and ulnar pulses normal \par \par Left Upper Extremity\par o Shoulder :\par ¦ Inspection/Palpation : mild tenderness over the greater tuberosity and over the proximal biceps tendon, no swelling, no deformities\par ¦ Range of Motion : ACTIVE FORWARD ELEVATION: Measured at 170 degrees, ACTIVE EXTERNAL ROTATION: Measured at 75 degrees, ACTIVE INTERNAL ROTATION: Measured at T6, ACTIVE ABDUCTION: measured at 160 degrees \par ¦ Strength : external rotation 5/5, internal rotation 5/5, supraspinatus 5/5 all with pain\par ¦ Stability : no joint instability on provocative testing\par ¦ Tests/Signs : Neer (+ mild), Abbott (+ mild), Speed’s Test (+ mild) \par o Upper Arm : no tenderness, no swelling, no deformities\par o Muscle Bulk : no atrophy\par o Sensation : sensation intact to light touch\par o Skin : no skin rash or discoloration\par o Vascular Exam : no edema, no cyanosis, radial and ulnar pulses normal \par \par Right Lower Extremity\par o Knee :\par ¦ Inspection/Palpation : no tenderness to palpation, no swelling, no deformity\par ¦ Range of Motion : 0 - 120 degrees, no crepitus\par ¦ Stability : no valgus or varus instability present on provocative testing, Lachman’s Test (-)\par ¦ Strength : hip flexion 5/5\par o Muscle Bulk : normal muscle bulk present\par o Skin : no erythema, no ecchymosis\par o Sensation : sensation to pin intact\par o Vascular Exam : no edema, no cyanosis, dorsalis pedis artery pulse 2+, posterior tibial artery pulse 2+\par \par Left Lower Extremity\par o Knee :\par ¦ Inspection/Palpation : tenderness to palpation superior to Gerdy's tubercle with localized swelling, no deformity\par ¦ Range of Motion : 0 -120 degrees, no crepitus\par ¦ Stability : no valgus or varus instability present on provocative testing, Lachman’s Test (-)\par ¦ Strength : hip flexion 4+/5\par o Muscle Bulk : normal muscle bulk present\par o Skin : no erythema, no ecchymosis\par o Sensation : sensation to pin intact\par o Vascular Exam : no edema, no cyanosis, dorsalis pedis artery pulse 2+, posterior tibial artery pulse 2+  [de-identified] : o Left Knee : AP, lateral, sunrise, and [place] views of the knee were obtained, there are no soft tissue abnormalities, no fractures, alignment is normal,  mild medial joint space narrowing, normal bone density, no bony lesions.\par \par

## 2021-06-17 NOTE — DISCUSSION/SUMMARY
[de-identified] : The underlying pathophysiology was reviewed in great detail with the patient as well as the various treatment options, including ice, analgesics, NSAIDs, Physical therapy, steroid injections. \par \par Continue physical therapy as prescribed,. A prescription for Physical Therapy was provided for the neck and the left knee.\par \par Activity modifications and restrictions were discussed. I advised avoiding overhead lifting. I advised the patient to work on good posture. \par \par Activity modifications and restrictions were discussed. I advised avoiding deep bending, squatting and high intensity activity.\par \par I discussed the importance of weight loss to alleviate her knee pain\par \par FU 6-8 weeks.\par \par All questions were answered, all alternatives discussed and the patient is in complete agreement with that plan. Follow-up appointment as instructed. Any issues and the patient will call or come in sooner.
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
## 2021-07-14 ENCOUNTER — APPOINTMENT (OUTPATIENT)
Dept: MRI IMAGING | Facility: HOSPITAL | Age: 46
End: 2021-07-14
Payer: MEDICARE

## 2021-07-14 ENCOUNTER — OUTPATIENT (OUTPATIENT)
Dept: OUTPATIENT SERVICES | Facility: HOSPITAL | Age: 46
LOS: 1 days | End: 2021-07-14
Payer: MEDICARE

## 2021-07-14 DIAGNOSIS — M17.12 UNILATERAL PRIMARY OSTEOARTHRITIS, LEFT KNEE: ICD-10-CM

## 2021-07-14 PROCEDURE — 73721 MRI JNT OF LWR EXTRE W/O DYE: CPT | Mod: 26,LT,MH

## 2021-07-14 PROCEDURE — 73721 MRI JNT OF LWR EXTRE W/O DYE: CPT

## 2021-07-15 ENCOUNTER — APPOINTMENT (OUTPATIENT)
Dept: FAMILY MEDICINE | Facility: CLINIC | Age: 46
End: 2021-07-15
Payer: MEDICARE

## 2021-07-15 VITALS — BODY MASS INDEX: 42.73 KG/M2 | WEIGHT: 281 LBS

## 2021-07-15 PROCEDURE — 96372 THER/PROPH/DIAG INJ SC/IM: CPT

## 2021-07-15 RX ORDER — CYANOCOBALAMIN 1000 UG/ML
1000 INJECTION INTRAMUSCULAR; SUBCUTANEOUS
Qty: 0 | Refills: 0 | Status: COMPLETED | OUTPATIENT
Start: 2021-07-15

## 2021-07-15 RX ADMIN — CYANOCOBALAMIN 0 MCG/ML: 1000 INJECTION INTRAMUSCULAR; SUBCUTANEOUS at 00:00

## 2021-07-20 ENCOUNTER — NON-APPOINTMENT (OUTPATIENT)
Age: 46
End: 2021-07-20

## 2021-08-23 ENCOUNTER — APPOINTMENT (OUTPATIENT)
Dept: FAMILY MEDICINE | Facility: CLINIC | Age: 46
End: 2021-08-23
Payer: MEDICARE

## 2021-08-23 VITALS — RESPIRATION RATE: 20 BRPM | HEART RATE: 76 BPM | SYSTOLIC BLOOD PRESSURE: 125 MMHG | DIASTOLIC BLOOD PRESSURE: 78 MMHG

## 2021-08-23 PROCEDURE — 96372 THER/PROPH/DIAG INJ SC/IM: CPT

## 2021-08-23 PROCEDURE — 99214 OFFICE O/P EST MOD 30 MIN: CPT | Mod: 25

## 2021-08-23 RX ORDER — CYANOCOBALAMIN 1000 UG/ML
1000 INJECTION INTRAMUSCULAR; SUBCUTANEOUS
Qty: 0 | Refills: 0 | Status: COMPLETED | OUTPATIENT
Start: 2021-08-23

## 2021-08-23 RX ADMIN — CYANOCOBALAMIN 0 MCG/ML: 1000 INJECTION INTRAMUSCULAR; SUBCUTANEOUS at 00:00

## 2021-08-23 NOTE — PHYSICAL EXAM
[No Acute Distress] : no acute distress [Normal] : normal rate, regular rhythm, normal S1 and S2 and no murmur heard [No Edema] : there was no peripheral edema [Soft] : abdomen soft [Normal Posterior Cervical Nodes] : no posterior cervical lymphadenopathy [Non Tender] : non-tender [Normal Anterior Cervical Nodes] : no anterior cervical lymphadenopathy [No Focal Deficits] : no focal deficits [Speech Grossly Normal] : speech grossly normal [Memory Grossly Normal] : memory grossly normal [Normal Affect] : the affect was normal [Alert and Oriented x3] : oriented to person, place, and time [Normal Mood] : the mood was normal [Normal Insight/Judgement] : insight and judgment were intact

## 2021-08-23 NOTE — REVIEW OF SYSTEMS
[Joint Pain] : joint pain [Joint Stiffness] : joint stiffness [Muscle Weakness] : muscle weakness [Muscle Pain] : muscle pain [Back Pain] : back pain [Unsteady Walking] : ataxia [Negative] : Psychiatric

## 2021-08-23 NOTE — ASSESSMENT
[FreeTextEntry1] : Hemodynamically stable with acceptable BP\par Lab profiles drawn in office and sent\par B12 1ml IM given R deltoid

## 2021-08-23 NOTE — HISTORY OF PRESENT ILLNESS
[de-identified] : Presents for BP check, labs, and general follow-up.  Also due for B12 injection.  States feeling generally well; trying to watch diet but is not self-checking.  Continues in PT - states starting to feel better, stronger.  Using pain mgt very appropriately to maintain good daily functioning and QOL with no adverse effects.

## 2021-08-24 LAB
ALBUMIN SERPL ELPH-MCNC: 4.2 G/DL
ALP BLD-CCNC: 87 U/L
ALT SERPL-CCNC: 7 U/L
ANION GAP SERPL CALC-SCNC: 13 MMOL/L
AST SERPL-CCNC: 8 U/L
BASOPHILS # BLD AUTO: 0.04 K/UL
BASOPHILS NFR BLD AUTO: 0.7 %
BILIRUB SERPL-MCNC: 0.3 MG/DL
BUN SERPL-MCNC: 8 MG/DL
CALCIUM SERPL-MCNC: 9.2 MG/DL
CHLORIDE SERPL-SCNC: 108 MMOL/L
CHOLEST SERPL-MCNC: 133 MG/DL
CO2 SERPL-SCNC: 22 MMOL/L
CREAT SERPL-MCNC: 0.89 MG/DL
EOSINOPHIL # BLD AUTO: 0.08 K/UL
EOSINOPHIL NFR BLD AUTO: 1.3 %
ESTIMATED AVERAGE GLUCOSE: 117 MG/DL
GLUCOSE SERPL-MCNC: 110 MG/DL
HBA1C MFR BLD HPLC: 5.7 %
HCT VFR BLD CALC: 41.5 %
HDLC SERPL-MCNC: 45 MG/DL
HGB BLD-MCNC: 12.6 G/DL
IMM GRANULOCYTES NFR BLD AUTO: 0.2 %
LDLC SERPL CALC-MCNC: 78 MG/DL
LYMPHOCYTES # BLD AUTO: 2.04 K/UL
LYMPHOCYTES NFR BLD AUTO: 33.2 %
MAN DIFF?: NORMAL
MCHC RBC-ENTMCNC: 26.4 PG
MCHC RBC-ENTMCNC: 30.4 GM/DL
MCV RBC AUTO: 86.8 FL
MONOCYTES # BLD AUTO: 0.4 K/UL
MONOCYTES NFR BLD AUTO: 6.5 %
NEUTROPHILS # BLD AUTO: 3.58 K/UL
NEUTROPHILS NFR BLD AUTO: 58.1 %
NONHDLC SERPL-MCNC: 89 MG/DL
PLATELET # BLD AUTO: 343 K/UL
POTASSIUM SERPL-SCNC: 4 MMOL/L
PROT SERPL-MCNC: 6.8 G/DL
RBC # BLD: 4.78 M/UL
RBC # FLD: 14.6 %
SODIUM SERPL-SCNC: 143 MMOL/L
T4 FREE SERPL-MCNC: 1.2 NG/DL
TRIGL SERPL-MCNC: 53 MG/DL
TSH SERPL-ACNC: 3.02 UIU/ML
VIT B12 SERPL-MCNC: 320 PG/ML
WBC # FLD AUTO: 6.15 K/UL

## 2021-09-27 ENCOUNTER — APPOINTMENT (OUTPATIENT)
Dept: FAMILY MEDICINE | Facility: CLINIC | Age: 46
End: 2021-09-27
Payer: MEDICARE

## 2021-09-27 VITALS — WEIGHT: 286 LBS | BODY MASS INDEX: 43.49 KG/M2

## 2021-09-27 DIAGNOSIS — Z23 ENCOUNTER FOR IMMUNIZATION: ICD-10-CM

## 2021-09-27 PROCEDURE — 96372 THER/PROPH/DIAG INJ SC/IM: CPT

## 2021-09-27 PROCEDURE — 90686 IIV4 VACC NO PRSV 0.5 ML IM: CPT

## 2021-09-27 PROCEDURE — G0008: CPT

## 2021-09-27 RX ORDER — CYANOCOBALAMIN 1000 UG/ML
1000 INJECTION INTRAMUSCULAR; SUBCUTANEOUS
Qty: 0 | Refills: 0 | Status: COMPLETED | OUTPATIENT
Start: 2021-09-27

## 2021-09-27 RX ADMIN — CYANOCOBALAMIN 0 MCG/ML: 1000 INJECTION INTRAMUSCULAR; SUBCUTANEOUS at 00:00

## 2021-09-29 ENCOUNTER — NON-APPOINTMENT (OUTPATIENT)
Age: 46
End: 2021-09-29

## 2021-10-01 ENCOUNTER — APPOINTMENT (OUTPATIENT)
Dept: OBGYN | Facility: CLINIC | Age: 46
End: 2021-10-01
Payer: MEDICARE

## 2021-10-01 ENCOUNTER — NON-APPOINTMENT (OUTPATIENT)
Age: 46
End: 2021-10-01

## 2021-10-01 VITALS
TEMPERATURE: 97.8 F | DIASTOLIC BLOOD PRESSURE: 78 MMHG | BODY MASS INDEX: 42.59 KG/M2 | HEIGHT: 68 IN | WEIGHT: 281 LBS | SYSTOLIC BLOOD PRESSURE: 122 MMHG | HEART RATE: 97 BPM | OXYGEN SATURATION: 98 %

## 2021-10-01 DIAGNOSIS — Z01.419 ENCOUNTER FOR GYNECOLOGICAL EXAMINATION (GENERAL) (ROUTINE) W/OUT ABNORMAL FINDINGS: ICD-10-CM

## 2021-10-01 DIAGNOSIS — R10.2 PELVIC AND PERINEAL PAIN: ICD-10-CM

## 2021-10-01 PROCEDURE — G0101: CPT

## 2021-10-01 PROCEDURE — 99396 PREV VISIT EST AGE 40-64: CPT | Mod: GY

## 2021-10-04 ENCOUNTER — OUTPATIENT (OUTPATIENT)
Dept: OUTPATIENT SERVICES | Facility: HOSPITAL | Age: 46
LOS: 1 days | End: 2021-10-04
Payer: MEDICARE

## 2021-10-04 ENCOUNTER — APPOINTMENT (OUTPATIENT)
Dept: ULTRASOUND IMAGING | Facility: HOSPITAL | Age: 46
End: 2021-10-04
Payer: MEDICARE

## 2021-10-04 DIAGNOSIS — Z00.8 ENCOUNTER FOR OTHER GENERAL EXAMINATION: ICD-10-CM

## 2021-10-04 PROCEDURE — 76700 US EXAM ABDOM COMPLETE: CPT | Mod: 26

## 2021-10-04 PROCEDURE — 76700 US EXAM ABDOM COMPLETE: CPT

## 2021-10-04 NOTE — HISTORY OF PRESENT ILLNESS
[Patient reported mammogram was normal] : Patient reported mammogram was normal [FreeTextEntry1] : 45 yo P0 with LMP 2 mo ago presents today for well woman exam. Co absence of menses for the last two months and pelvic bloating\par \par \par POB: denies\par PGYN: ?perimenopausal, ?herpes, hx of hpv, however last pap normal per patient\par PMH: bipolar do, anxiety, t2dm, asthma\par PSH: minor hand surgery\par Med: per MAR\par All:tylenol 3, wellbutrin, zoloft\par SH: two pp week smoker\par  [Mammogramdate] : 2019

## 2021-10-04 NOTE — PLAN
[FreeTextEntry1] : I spent the time noted on the day of this patient encounter preparing for, providing and documenting the above E/M service and counseling and educate patient on differential, workup, disease course, and treatment/management. Education was provided to the patient during this encounter. All questions and concerns were answered and addressed in detail.\par \par Manjula Douglas MD\par

## 2021-10-05 LAB
HCG UR QL: NEGATIVE
HPV HIGH+LOW RISK DNA PNL CVX: NOT DETECTED
QUALITY CONTROL: YES

## 2021-10-06 ENCOUNTER — RX RENEWAL (OUTPATIENT)
Age: 46
End: 2021-10-06

## 2021-10-06 LAB
C TRACH RRNA SPEC QL NAA+PROBE: NOT DETECTED
CANDIDA VAG CYTO: NOT DETECTED
ESTRADIOL SERPL-MCNC: 109 PG/ML
G VAGINALIS+PREV SP MTYP VAG QL MICRO: NOT DETECTED
HSV 1+2 IGG SER IA-IMP: POSITIVE
HSV 1+2 IGG SER IA-IMP: POSITIVE
HSV1 IGG SER QL: 48 INDEX
HSV2 IGG SER QL: 6.21 INDEX
N GONORRHOEA RRNA SPEC QL NAA+PROBE: NOT DETECTED
SOURCE AMPLIFICATION: NORMAL
T VAGINALIS VAG QL WET PREP: NOT DETECTED

## 2021-10-06 RX ORDER — PANTOPRAZOLE 40 MG/1
40 TABLET, DELAYED RELEASE ORAL
Qty: 90 | Refills: 3 | Status: ACTIVE | COMMUNITY
Start: 2018-09-10 | End: 1900-01-01

## 2021-10-07 ENCOUNTER — RX RENEWAL (OUTPATIENT)
Age: 46
End: 2021-10-07

## 2021-10-07 LAB — CYTOLOGY CVX/VAG DOC THIN PREP: NORMAL

## 2021-10-07 RX ORDER — POLYETHYLENE GLYCOL 3350 17 G/17G
17 POWDER, FOR SOLUTION ORAL
Qty: 510 | Refills: 3 | Status: ACTIVE | COMMUNITY
Start: 2021-10-07 | End: 1900-01-01

## 2021-10-11 ENCOUNTER — APPOINTMENT (OUTPATIENT)
Dept: ULTRASOUND IMAGING | Facility: HOSPITAL | Age: 46
End: 2021-10-11
Payer: MEDICARE

## 2021-10-11 ENCOUNTER — OUTPATIENT (OUTPATIENT)
Dept: OUTPATIENT SERVICES | Facility: HOSPITAL | Age: 46
LOS: 1 days | End: 2021-10-11
Payer: MEDICARE

## 2021-10-11 DIAGNOSIS — R10.2 PELVIC AND PERINEAL PAIN: ICD-10-CM

## 2021-10-11 LAB
FSH: 7.4 MIU/ML
HSV1 IGM SER QL: NEGATIVE
HSV2 AB FLD-ACNC: NEGATIVE

## 2021-10-11 PROCEDURE — 76830 TRANSVAGINAL US NON-OB: CPT

## 2021-10-11 PROCEDURE — 76830 TRANSVAGINAL US NON-OB: CPT | Mod: 26

## 2021-10-20 DIAGNOSIS — R93.89 ABNORMAL FINDINGS ON DIAGNOSTIC IMAGING OF OTHER SPECIFIED BODY STRUCTURES: ICD-10-CM

## 2021-10-25 ENCOUNTER — APPOINTMENT (OUTPATIENT)
Dept: FAMILY MEDICINE | Facility: CLINIC | Age: 46
End: 2021-10-25
Payer: MEDICARE

## 2021-10-28 ENCOUNTER — APPOINTMENT (OUTPATIENT)
Dept: OBGYN | Facility: CLINIC | Age: 46
End: 2021-10-28
Payer: MEDICARE

## 2021-10-28 VITALS
BODY MASS INDEX: 42.59 KG/M2 | DIASTOLIC BLOOD PRESSURE: 78 MMHG | TEMPERATURE: 97.9 F | OXYGEN SATURATION: 98 % | WEIGHT: 281 LBS | SYSTOLIC BLOOD PRESSURE: 122 MMHG | HEART RATE: 86 BPM | HEIGHT: 68 IN

## 2021-10-28 DIAGNOSIS — N85.8 OTHER SPECIFIED NONINFLAMMATORY DISORDERS OF UTERUS: ICD-10-CM

## 2021-10-28 PROCEDURE — 99214 OFFICE O/P EST MOD 30 MIN: CPT | Mod: 25

## 2021-10-28 PROCEDURE — 58100 BIOPSY OF UTERUS LINING: CPT

## 2021-10-28 RX ORDER — SODIUM PICOSULFATE, MAGNESIUM OXIDE, AND ANHYDROUS CITRIC ACID 10; 3.5; 12 MG/160ML; G/160ML; G/160ML
10-3.5-12 MG-GM LIQUID ORAL
Qty: 1 | Refills: 0 | Status: DISCONTINUED | COMMUNITY
Start: 2021-03-26 | End: 2021-10-28

## 2021-10-28 RX ORDER — FLUOXETINE HYDROCHLORIDE 20 MG/1
20 CAPSULE ORAL
Qty: 60 | Refills: 0 | Status: DISCONTINUED | COMMUNITY
Start: 2016-11-09 | End: 2021-10-28

## 2021-10-28 RX ORDER — LORATADINE 5 MG/5 ML
10 SOLUTION, ORAL ORAL
Qty: 30 | Refills: 3 | Status: DISCONTINUED | COMMUNITY
Start: 2017-07-24 | End: 2021-10-28

## 2021-10-28 RX ORDER — FLUCONAZOLE 150 MG/1
150 TABLET ORAL
Qty: 1 | Refills: 4 | Status: DISCONTINUED | COMMUNITY
Start: 2017-09-05 | End: 2021-10-28

## 2021-10-28 RX ORDER — DOXYCYCLINE 100 MG/1
100 CAPSULE ORAL TWICE DAILY
Qty: 20 | Refills: 0 | Status: DISCONTINUED | COMMUNITY
Start: 2019-06-07 | End: 2021-10-28

## 2021-10-28 NOTE — PLAN
[FreeTextEntry1] : - Post procedure counseling given--patient to practice pelvic rest for 1 week, this includes nothing in the vagina, no submerging the vagina in water, no douching, no tampons\par \par \par I spent the time noted on the day of this patient encounter preparing for, providing and documenting the above E/M service and counseling and educate patient on differential, workup, disease course, and treatment/management. Education was provided to the patient during this encounter. All questions and concerns were answered and addressed in detail.\par \par Manjula Douglas MD\par

## 2021-10-28 NOTE — PROCEDURE
[Endometrial Biopsy] : Endometrial biopsy [Time out performed] : Pre-procedure time out performed.  Patient's name, date of birth and procedure confirmed. [Consent Obtained] : Consent obtained [Risks] : risks [Benefits] : benefits [Alternatives] : alternatives [Patient] : patient [Infection] : infection [Bleeding] : bleeding [Allergic Reaction] : allergic reaction [Uterine Perforation] : uterine perforation [Pain] : pain [Negative] : negative pregnancy test [No Premedication] : No premedication [None] : none [Tenaculum] : Tenaculum [Easy Passage] : Easy passage [Sounded to ___ cm] : sounded to [unfilled] ~Ucm [Scant] : scant [Sent to Pathology] : placed in buffered formalin and sent for pathology [Tolerated Well] : Patient tolerated the procedure well [No Complications] : No complications [de-identified] : thickened post uterus, fkuid in endocervical canal [LMPDate] : 10/14

## 2021-10-29 LAB
HCG UR QL: NEGATIVE
QUALITY CONTROL: YES

## 2021-11-04 ENCOUNTER — APPOINTMENT (OUTPATIENT)
Dept: MAMMOGRAPHY | Facility: HOSPITAL | Age: 46
End: 2021-11-04

## 2021-11-04 ENCOUNTER — APPOINTMENT (OUTPATIENT)
Dept: ULTRASOUND IMAGING | Facility: HOSPITAL | Age: 46
End: 2021-11-04

## 2021-11-11 ENCOUNTER — APPOINTMENT (OUTPATIENT)
Dept: ULTRASOUND IMAGING | Facility: HOSPITAL | Age: 46
End: 2021-11-11
Payer: MEDICARE

## 2021-11-11 ENCOUNTER — OUTPATIENT (OUTPATIENT)
Dept: OUTPATIENT SERVICES | Facility: HOSPITAL | Age: 46
LOS: 1 days | End: 2021-11-11
Payer: MEDICARE

## 2021-11-11 ENCOUNTER — RESULT REVIEW (OUTPATIENT)
Age: 46
End: 2021-11-11

## 2021-11-11 ENCOUNTER — APPOINTMENT (OUTPATIENT)
Dept: MAMMOGRAPHY | Facility: HOSPITAL | Age: 46
End: 2021-11-11
Payer: MEDICARE

## 2021-11-11 ENCOUNTER — APPOINTMENT (OUTPATIENT)
Dept: MRI IMAGING | Facility: HOSPITAL | Age: 46
End: 2021-11-11
Payer: MEDICARE

## 2021-11-11 DIAGNOSIS — R93.89 ABNORMAL FINDINGS ON DIAGNOSTIC IMAGING OF OTHER SPECIFIED BODY STRUCTURES: ICD-10-CM

## 2021-11-11 PROCEDURE — G0279: CPT | Mod: 26

## 2021-11-11 PROCEDURE — 72197 MRI PELVIS W/O & W/DYE: CPT | Mod: 26,ME

## 2021-11-11 PROCEDURE — G1004: CPT

## 2021-11-11 PROCEDURE — 77066 DX MAMMO INCL CAD BI: CPT | Mod: 26

## 2021-11-11 PROCEDURE — 76641 ULTRASOUND BREAST COMPLETE: CPT | Mod: 26,50

## 2021-11-11 PROCEDURE — 76641 ULTRASOUND BREAST COMPLETE: CPT

## 2021-11-11 PROCEDURE — 72197 MRI PELVIS W/O & W/DYE: CPT | Mod: ME

## 2021-11-11 PROCEDURE — A9579: CPT

## 2021-11-11 PROCEDURE — 77066 DX MAMMO INCL CAD BI: CPT

## 2021-11-11 PROCEDURE — G0279: CPT

## 2021-11-15 ENCOUNTER — RX RENEWAL (OUTPATIENT)
Age: 46
End: 2021-11-15

## 2021-11-15 RX ORDER — HYDROCORTISONE 25 MG/G
2.5 CREAM TOPICAL
Qty: 30 | Refills: 5 | Status: ACTIVE | COMMUNITY
Start: 2021-11-15 | End: 1900-01-01

## 2021-11-17 LAB — CORE LAB BIOPSY: NORMAL

## 2021-12-03 ENCOUNTER — APPOINTMENT (OUTPATIENT)
Dept: FAMILY MEDICINE | Facility: CLINIC | Age: 46
End: 2021-12-03
Payer: MEDICARE

## 2021-12-03 ENCOUNTER — RX RENEWAL (OUTPATIENT)
Age: 46
End: 2021-12-03

## 2021-12-03 VITALS
SYSTOLIC BLOOD PRESSURE: 122 MMHG | BODY MASS INDEX: 44.25 KG/M2 | HEART RATE: 76 BPM | RESPIRATION RATE: 20 BRPM | HEIGHT: 68 IN | WEIGHT: 292 LBS | DIASTOLIC BLOOD PRESSURE: 75 MMHG

## 2021-12-03 PROCEDURE — 96372 THER/PROPH/DIAG INJ SC/IM: CPT

## 2021-12-03 PROCEDURE — 99214 OFFICE O/P EST MOD 30 MIN: CPT | Mod: 25

## 2021-12-03 PROCEDURE — 36415 COLL VENOUS BLD VENIPUNCTURE: CPT

## 2021-12-03 RX ORDER — CYANOCOBALAMIN 1000 UG/ML
1000 INJECTION INTRAMUSCULAR; SUBCUTANEOUS
Qty: 0 | Refills: 0 | Status: COMPLETED | OUTPATIENT
Start: 2021-12-03

## 2021-12-03 RX ADMIN — CYANOCOBALAMIN 0 MCG/ML: 1000 INJECTION INTRAMUSCULAR; SUBCUTANEOUS at 00:00

## 2021-12-03 NOTE — HISTORY OF PRESENT ILLNESS
[de-identified] : Presents for BP check, labs, and  general follow-up; also due for monthly B12 injection.  Pt acknowledges a wt gain; note is not self-checking.

## 2021-12-04 LAB
25(OH)D3 SERPL-MCNC: 33.5 NG/ML
ALBUMIN SERPL ELPH-MCNC: 4.4 G/DL
ALP BLD-CCNC: 80 U/L
ALT SERPL-CCNC: 12 U/L
ANION GAP SERPL CALC-SCNC: 13 MMOL/L
AST SERPL-CCNC: 11 U/L
BASOPHILS # BLD AUTO: 0.05 K/UL
BASOPHILS NFR BLD AUTO: 0.6 %
BILIRUB SERPL-MCNC: 0.3 MG/DL
BUN SERPL-MCNC: 7 MG/DL
CALCIUM SERPL-MCNC: 8.9 MG/DL
CHLORIDE SERPL-SCNC: 104 MMOL/L
CHOLEST SERPL-MCNC: 141 MG/DL
CO2 SERPL-SCNC: 21 MMOL/L
CREAT SERPL-MCNC: 0.78 MG/DL
EOSINOPHIL # BLD AUTO: 0.14 K/UL
EOSINOPHIL NFR BLD AUTO: 1.6 %
ESTIMATED AVERAGE GLUCOSE: 123 MG/DL
GLUCOSE SERPL-MCNC: 91 MG/DL
HBA1C MFR BLD HPLC: 5.9 %
HCT VFR BLD CALC: 39.4 %
HDLC SERPL-MCNC: 51 MG/DL
HGB BLD-MCNC: 12.2 G/DL
IMM GRANULOCYTES NFR BLD AUTO: 0.2 %
LDLC SERPL CALC-MCNC: 80 MG/DL
LYMPHOCYTES # BLD AUTO: 2.58 K/UL
LYMPHOCYTES NFR BLD AUTO: 29 %
MAN DIFF?: NORMAL
MCHC RBC-ENTMCNC: 26.4 PG
MCHC RBC-ENTMCNC: 31 GM/DL
MCV RBC AUTO: 85.3 FL
MONOCYTES # BLD AUTO: 0.55 K/UL
MONOCYTES NFR BLD AUTO: 6.2 %
NEUTROPHILS # BLD AUTO: 5.56 K/UL
NEUTROPHILS NFR BLD AUTO: 62.4 %
NONHDLC SERPL-MCNC: 91 MG/DL
PLATELET # BLD AUTO: 344 K/UL
POTASSIUM SERPL-SCNC: 3.7 MMOL/L
PROT SERPL-MCNC: 6.7 G/DL
RBC # BLD: 4.62 M/UL
RBC # FLD: 14.6 %
SODIUM SERPL-SCNC: 137 MMOL/L
TRIGL SERPL-MCNC: 56 MG/DL
VIT B12 SERPL-MCNC: 331 PG/ML
WBC # FLD AUTO: 8.9 K/UL
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
## 2021-12-09 DIAGNOSIS — Z11.59 ENCOUNTER FOR SCREENING FOR OTHER VIRAL DISEASES: ICD-10-CM

## 2021-12-11 LAB — SARS-COV-2 N GENE NPH QL NAA+PROBE: NOT DETECTED

## 2021-12-20 ENCOUNTER — APPOINTMENT (OUTPATIENT)
Dept: ORTHOPEDIC SURGERY | Facility: CLINIC | Age: 46
End: 2021-12-20
Payer: MEDICARE

## 2021-12-20 VITALS — BODY MASS INDEX: 44.25 KG/M2 | HEIGHT: 68 IN | WEIGHT: 292 LBS

## 2021-12-20 DIAGNOSIS — G89.29 LUMBAGO WITH SCIATICA, LEFT SIDE: ICD-10-CM

## 2021-12-20 DIAGNOSIS — M62.838 OTHER MUSCLE SPASM: ICD-10-CM

## 2021-12-20 DIAGNOSIS — M54.42 LUMBAGO WITH SCIATICA, LEFT SIDE: ICD-10-CM

## 2021-12-20 DIAGNOSIS — M17.12 UNILATERAL PRIMARY OSTEOARTHRITIS, LEFT KNEE: ICD-10-CM

## 2021-12-20 DIAGNOSIS — M77.8 OTHER ENTHESOPATHIES, NOT ELSEWHERE CLASSIFIED: ICD-10-CM

## 2021-12-20 PROCEDURE — 99214 OFFICE O/P EST MOD 30 MIN: CPT

## 2021-12-20 NOTE — PHYSICAL EXAM
[Cane] : ambulates with cane [Normal RUE] : Right Upper Extremity: No scars, rashes, lesions, ulcers, skin intact [Normal LUE] : Left Upper Extremity: No scars, rashes, lesions, ulcers, skin intact [Obese] : obese [Poor Appearance] : well-appearing [Acute Distress] : not in acute distress [de-identified] : Cervical Spine/Neck\par Inspection/Palpation :\par ¦ Inspection : alignment midline, normal degree of lordosis present, thoracic kyphosis noted\par ¦ Skin : normal appearance, no masses, no tenderness, trachea midline, \par ¦ Palpation : left paraspinal and trapezius musculature is mildly tender to palpation with palpable tightness noted. \par ¦ Tests and Signs : Spurling’s (-), Lhermitte’s (-)\par ¦ Range of Motion : arc of motion full in all planes, no crepitus,  pain with left rotation \par ¦ Stability : no subluxations or other evidence of instability demonstrated during range of motion testing\par o Muscle Strength : paraspinal muscle strength within normal limits\par o Muscle Tone : paraspinal muscle tone within normal limits\par o Muscle Bulk : normal, no atrophy\par o Cervical Lymph Nodes : no lymphadenopathy present\par ¦ Upper Extremity Strength : elbow flexion and extension 5/5, wrist extension, flexion, ulnar deviation and radial deviation 5/5, all intrinsic and extrinsic hand muscles 5/5\par o Special Tests: Lucas’s Reflex (-) \par  \par Right Upper Extremity\par o Shoulder :\par ¦ Inspection/Palpation :no tenderness, no swelling, no deformities\par ¦ Range of Motion : ACTIVE FORWARD ELEVATION: Measured at 170 degrees, ACTIVE EXTERNAL ROTATION: Measured at 75 degrees, ACTIVE INTERNAL ROTATION: Measured at T9, ACTIVE  ABDUCTION: Measured at 160 degrees \par ¦ Strength : external rotation 5/5, internal rotation 5/5, supraspinatus 5/5 \par ¦ Stability : no joint instability on provocative testing\par ¦ Tests/Signs : Neer (-), Abbott (+), Speed’s Test (-) \par o Upper Arm : no tenderness, no swelling, no deformities\par o Muscle Bulk : no atrophy\par o Sensation : sensation intact to light touch\par o Skin : no skin rash or discoloration\par o Vascular Exam : no edema, no cyanosis, radial and ulnar pulses normal \par \par Left Upper Extremity\par o Shoulder :\par ¦ Inspection/Palpation : mild tenderness over the greater tuberosity and over the proximal biceps tendon, no swelling, no deformities\par ¦ Range of Motion : ACTIVE FORWARD ELEVATION: Measured at 170 degrees, ACTIVE EXTERNAL ROTATION: Measured at 75 degrees, ACTIVE INTERNAL ROTATION: Measured at T6, ACTIVE ABDUCTION: measured at 160 degrees \par ¦ Strength : external rotation 5/5, internal rotation 5/5, supraspinatus 5/5 all with pain\par ¦ Stability : no joint instability on provocative testing\par ¦ Tests/Signs : Neer (+ mild), Abbott (+ mild), Speed’s Test (+ mild) \par o Upper Arm : no tenderness, no swelling, no deformities\par o Muscle Bulk : no atrophy\par o Sensation : sensation intact to light touch\par o Skin : no skin rash or discoloration\par o Vascular Exam : no edema, no cyanosis, radial and ulnar pulses normal \par \par Right Lower Extremity\par o Knee :\par ¦ Inspection/Palpation : no tenderness to palpation, no swelling, no deformity\par ¦ Range of Motion : 0 - 120 degrees, no crepitus\par ¦ Stability : no valgus or varus instability present on provocative testing, Lachman’s Test (-)\par ¦ Strength : hip flexion 5/5\par o Muscle Bulk : normal muscle bulk present\par o Skin : no erythema, no ecchymosis\par o Sensation : sensation to pin intact\par o Vascular Exam : no edema, no cyanosis, dorsalis pedis artery pulse 2+, posterior tibial artery pulse 2+\par \par Left Lower Extremity\par o Knee :\par ¦ Inspection/Palpation : tenderness to palpation superior to Gerdy's tubercle with localized swelling, no deformity\par ¦ Range of Motion : 0 -120 degrees, no crepitus\par ¦ Stability : no valgus or varus instability present on provocative testing, Lachman’s Test (-)\par ¦ Strength : hip flexion 4+/5\par o Muscle Bulk : normal muscle bulk present\par o Skin : no erythema, no ecchymosis\par o Sensation : sensation to pin intact\par o Vascular Exam : no edema, no cyanosis, dorsalis pedis artery pulse 2+, posterior tibial artery pulse 2+  [de-identified] : \par

## 2021-12-20 NOTE — DISCUSSION/SUMMARY
[de-identified] : The underlying pathophysiology was reviewed in great detail with the patient as well as the various treatment options, including ice, analgesics, NSAIDs, Physical therapy, steroid injections. \par \par Continue physical therapy as prescribed,. A prescription for Physical Therapy was provided for the neck and the left knee.\par \par Activity modifications and restrictions were discussed. I advised avoiding overhead lifting. I advised the patient to work on good posture. \par \par Activity modifications and restrictions were discussed. I advised avoiding deep bending, squatting and high intensity activity.\par \par I discussed the importance of weight loss to alleviate her knee pain\par \par FU 6-8 weeks.\par \par All questions were answered, all alternatives discussed and the patient is in complete agreement with that plan. Follow-up appointment as instructed. Any issues and the patient will call or come in sooner.
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
## 2021-12-31 LAB — SARS-COV-2 N GENE NPH QL NAA+PROBE: NOT DETECTED

## 2022-01-03 ENCOUNTER — APPOINTMENT (OUTPATIENT)
Dept: FAMILY MEDICINE | Facility: CLINIC | Age: 47
End: 2022-01-03
Payer: MEDICARE

## 2022-01-03 PROCEDURE — 96372 THER/PROPH/DIAG INJ SC/IM: CPT

## 2022-01-03 RX ORDER — CYANOCOBALAMIN 1000 UG/ML
1000 INJECTION INTRAMUSCULAR; SUBCUTANEOUS
Qty: 0 | Refills: 0 | Status: COMPLETED | OUTPATIENT
Start: 2022-01-03

## 2022-01-03 RX ADMIN — CYANOCOBALAMIN 0 MCG/ML: 1000 INJECTION INTRAMUSCULAR; SUBCUTANEOUS at 00:00

## 2022-01-20 RX ORDER — FLUCONAZOLE 150 MG/1
150 TABLET ORAL
Qty: 2 | Refills: 3 | Status: ACTIVE | COMMUNITY
Start: 2022-01-20 | End: 1900-01-01

## 2022-02-03 ENCOUNTER — RX RENEWAL (OUTPATIENT)
Age: 47
End: 2022-02-03

## 2022-02-09 ENCOUNTER — RX RENEWAL (OUTPATIENT)
Age: 47
End: 2022-02-09

## 2022-02-09 RX ORDER — ATORVASTATIN CALCIUM 80 MG/1
80 TABLET, FILM COATED ORAL
Qty: 90 | Refills: 3 | Status: ACTIVE | COMMUNITY
Start: 2019-07-09 | End: 1900-01-01

## 2022-02-17 DIAGNOSIS — R07.89 OTHER CHEST PAIN: ICD-10-CM

## 2022-02-18 ENCOUNTER — APPOINTMENT (OUTPATIENT)
Dept: RADIOLOGY | Facility: HOSPITAL | Age: 47
End: 2022-02-18
Payer: MEDICARE

## 2022-02-18 ENCOUNTER — OUTPATIENT (OUTPATIENT)
Dept: OUTPATIENT SERVICES | Facility: HOSPITAL | Age: 47
LOS: 1 days | End: 2022-02-18
Payer: MEDICARE

## 2022-02-18 DIAGNOSIS — R07.89 OTHER CHEST PAIN: ICD-10-CM

## 2022-02-18 PROCEDURE — 71046 X-RAY EXAM CHEST 2 VIEWS: CPT

## 2022-02-18 PROCEDURE — 71046 X-RAY EXAM CHEST 2 VIEWS: CPT | Mod: 26

## 2022-02-28 ENCOUNTER — APPOINTMENT (OUTPATIENT)
Dept: FAMILY MEDICINE | Facility: CLINIC | Age: 47
End: 2022-02-28
Payer: MEDICARE

## 2022-02-28 VITALS
HEIGHT: 68 IN | WEIGHT: 292 LBS | BODY MASS INDEX: 44.25 KG/M2 | SYSTOLIC BLOOD PRESSURE: 126 MMHG | RESPIRATION RATE: 20 BRPM | HEART RATE: 76 BPM | DIASTOLIC BLOOD PRESSURE: 70 MMHG

## 2022-02-28 PROCEDURE — 99214 OFFICE O/P EST MOD 30 MIN: CPT | Mod: 25

## 2022-02-28 PROCEDURE — 96372 THER/PROPH/DIAG INJ SC/IM: CPT

## 2022-02-28 PROCEDURE — 36415 COLL VENOUS BLD VENIPUNCTURE: CPT

## 2022-02-28 RX ORDER — CYANOCOBALAMIN 1000 UG/ML
1000 INJECTION INTRAMUSCULAR; SUBCUTANEOUS
Qty: 0 | Refills: 0 | Status: COMPLETED | OUTPATIENT
Start: 2022-02-28

## 2022-02-28 RX ORDER — BLOOD-GLUCOSE METER
W/DEVICE KIT MISCELLANEOUS
Qty: 1 | Refills: 0 | Status: ACTIVE | COMMUNITY
Start: 2022-02-28 | End: 1900-01-01

## 2022-02-28 RX ADMIN — CYANOCOBALAMIN 0 MCG/ML: 1000 INJECTION INTRAMUSCULAR; SUBCUTANEOUS at 00:00

## 2022-02-28 NOTE — HISTORY OF PRESENT ILLNESS
[de-identified] : Presents for BP check, labs, and general follow-up; also due for B12 injection.  States not self-checking as she has misplaced her device (sent order for a new one).

## 2022-03-01 LAB
25(OH)D3 SERPL-MCNC: 41.4 NG/ML
ALBUMIN SERPL ELPH-MCNC: 4.5 G/DL
ALP BLD-CCNC: 98 U/L
ALT SERPL-CCNC: 20 U/L
ANION GAP SERPL CALC-SCNC: 12 MMOL/L
AST SERPL-CCNC: 12 U/L
BASOPHILS # BLD AUTO: 0.06 K/UL
BASOPHILS NFR BLD AUTO: 0.9 %
BILIRUB SERPL-MCNC: 0.4 MG/DL
BUN SERPL-MCNC: 8 MG/DL
CALCIUM SERPL-MCNC: 9.4 MG/DL
CHLORIDE SERPL-SCNC: 106 MMOL/L
CHOLEST SERPL-MCNC: 164 MG/DL
CO2 SERPL-SCNC: 24 MMOL/L
CREAT SERPL-MCNC: 0.9 MG/DL
EGFR: 79 ML/MIN/1.73M2
EOSINOPHIL # BLD AUTO: 0.25 K/UL
EOSINOPHIL NFR BLD AUTO: 3.9 %
ESTIMATED AVERAGE GLUCOSE: 126 MG/DL
GLUCOSE SERPL-MCNC: 99 MG/DL
HBA1C MFR BLD HPLC: 6 %
HCT VFR BLD CALC: 42 %
HDLC SERPL-MCNC: 54 MG/DL
HGB BLD-MCNC: 12.6 G/DL
IMM GRANULOCYTES NFR BLD AUTO: 0.3 %
LDLC SERPL CALC-MCNC: 94 MG/DL
LYMPHOCYTES # BLD AUTO: 2.43 K/UL
LYMPHOCYTES NFR BLD AUTO: 37.4 %
MAN DIFF?: NORMAL
MCHC RBC-ENTMCNC: 25.3 PG
MCHC RBC-ENTMCNC: 30 GM/DL
MCV RBC AUTO: 84.3 FL
MONOCYTES # BLD AUTO: 0.49 K/UL
MONOCYTES NFR BLD AUTO: 7.6 %
NEUTROPHILS # BLD AUTO: 3.24 K/UL
NEUTROPHILS NFR BLD AUTO: 49.9 %
NONHDLC SERPL-MCNC: 109 MG/DL
PLATELET # BLD AUTO: 352 K/UL
POTASSIUM SERPL-SCNC: 4.1 MMOL/L
PROT SERPL-MCNC: 7.1 G/DL
RBC # BLD: 4.98 M/UL
RBC # FLD: 14.6 %
SODIUM SERPL-SCNC: 143 MMOL/L
TRIGL SERPL-MCNC: 75 MG/DL
VIT B12 SERPL-MCNC: 465 PG/ML
WBC # FLD AUTO: 6.49 K/UL

## 2022-03-04 ENCOUNTER — OUTPATIENT (OUTPATIENT)
Dept: OUTPATIENT SERVICES | Facility: HOSPITAL | Age: 47
LOS: 1 days | End: 2022-03-04
Payer: MEDICARE

## 2022-03-04 DIAGNOSIS — R10.11 RIGHT UPPER QUADRANT PAIN: ICD-10-CM

## 2022-03-04 PROCEDURE — 78227 HEPATOBIL SYST IMAGE W/DRUG: CPT | Mod: MH

## 2022-03-04 PROCEDURE — A9537: CPT

## 2022-03-04 PROCEDURE — 78227 HEPATOBIL SYST IMAGE W/DRUG: CPT | Mod: 26,MH

## 2022-03-04 RX ORDER — SINCALIDE 5 UG/5ML
2.3 INJECTION, POWDER, LYOPHILIZED, FOR SOLUTION INTRAVENOUS ONCE
Refills: 0 | Status: DISCONTINUED | OUTPATIENT
Start: 2022-03-04 | End: 2022-03-18

## 2022-03-06 ENCOUNTER — RX RENEWAL (OUTPATIENT)
Age: 47
End: 2022-03-06

## 2022-03-28 ENCOUNTER — APPOINTMENT (OUTPATIENT)
Dept: FAMILY MEDICINE | Facility: CLINIC | Age: 47
End: 2022-03-28
Payer: MEDICARE

## 2022-03-28 PROCEDURE — 96372 THER/PROPH/DIAG INJ SC/IM: CPT

## 2022-03-28 RX ORDER — CYANOCOBALAMIN 1000 UG/ML
1000 INJECTION INTRAMUSCULAR; SUBCUTANEOUS
Qty: 0 | Refills: 0 | Status: COMPLETED | OUTPATIENT
Start: 2022-03-28

## 2022-03-28 RX ADMIN — CYANOCOBALAMIN 0 MCG/ML: 1000 INJECTION INTRAMUSCULAR; SUBCUTANEOUS at 00:00

## 2022-03-28 NOTE — REASON FOR VISIT
Spoke to pt and she states she has been having diarrhea for two days. Pt states she is not having any other symptoms. Denies fever. She cannot tell me if she is in pain because she states she is always in pain. Please advise   [Procedure: _________] : a [unfilled] procedure visit

## 2022-04-04 ENCOUNTER — APPOINTMENT (OUTPATIENT)
Dept: ORTHOPEDIC SURGERY | Facility: CLINIC | Age: 47
End: 2022-04-04

## 2022-04-07 ENCOUNTER — RX RENEWAL (OUTPATIENT)
Age: 47
End: 2022-04-07

## 2022-04-11 PROBLEM — Z11.59 SCREENING FOR VIRAL DISEASE: Status: ACTIVE | Noted: 2021-12-09

## 2022-04-14 ENCOUNTER — RX RENEWAL (OUTPATIENT)
Age: 47
End: 2022-04-14

## 2022-04-28 ENCOUNTER — APPOINTMENT (OUTPATIENT)
Dept: FAMILY MEDICINE | Facility: CLINIC | Age: 47
End: 2022-04-28
Payer: MEDICARE

## 2022-04-28 PROCEDURE — 96372 THER/PROPH/DIAG INJ SC/IM: CPT

## 2022-04-28 RX ORDER — CYANOCOBALAMIN 1000 UG/ML
1000 INJECTION INTRAMUSCULAR; SUBCUTANEOUS
Qty: 0 | Refills: 0 | Status: COMPLETED | OUTPATIENT
Start: 2022-04-28

## 2022-04-28 RX ADMIN — CYANOCOBALAMIN 0 MCG/ML: 1000 INJECTION INTRAMUSCULAR; SUBCUTANEOUS at 00:00

## 2022-04-29 RX ORDER — ALBUTEROL SULFATE 90 UG/1
108 (90 BASE) AEROSOL, METERED RESPIRATORY (INHALATION)
Qty: 1 | Refills: 5 | Status: ACTIVE | COMMUNITY
Start: 2020-11-20 | End: 1900-01-01

## 2022-05-16 ENCOUNTER — RX RENEWAL (OUTPATIENT)
Age: 47
End: 2022-05-16

## 2022-05-16 RX ORDER — FLUTICASONE PROPIONATE 50 UG/1
50 SPRAY, METERED NASAL
Qty: 16 | Refills: 3 | Status: ACTIVE | COMMUNITY
Start: 2017-07-24 | End: 1900-01-01

## 2022-05-20 ENCOUNTER — APPOINTMENT (OUTPATIENT)
Dept: RADIOLOGY | Facility: HOSPITAL | Age: 47
End: 2022-05-20
Payer: MEDICARE

## 2022-05-20 ENCOUNTER — OUTPATIENT (OUTPATIENT)
Dept: OUTPATIENT SERVICES | Facility: HOSPITAL | Age: 47
LOS: 1 days | End: 2022-05-20
Payer: MEDICARE

## 2022-05-20 DIAGNOSIS — Z00.8 ENCOUNTER FOR OTHER GENERAL EXAMINATION: ICD-10-CM

## 2022-05-20 PROCEDURE — 73630 X-RAY EXAM OF FOOT: CPT

## 2022-05-20 PROCEDURE — 73630 X-RAY EXAM OF FOOT: CPT | Mod: 26,LT

## 2022-05-24 ENCOUNTER — APPOINTMENT (OUTPATIENT)
Dept: FAMILY MEDICINE | Facility: CLINIC | Age: 47
End: 2022-05-24
Payer: MEDICARE

## 2022-05-24 VITALS
DIASTOLIC BLOOD PRESSURE: 70 MMHG | HEART RATE: 76 BPM | HEIGHT: 68 IN | BODY MASS INDEX: 44.1 KG/M2 | SYSTOLIC BLOOD PRESSURE: 130 MMHG | RESPIRATION RATE: 20 BRPM | WEIGHT: 291 LBS

## 2022-05-24 PROCEDURE — 96372 THER/PROPH/DIAG INJ SC/IM: CPT

## 2022-05-24 PROCEDURE — 36415 COLL VENOUS BLD VENIPUNCTURE: CPT

## 2022-05-24 PROCEDURE — 99214 OFFICE O/P EST MOD 30 MIN: CPT | Mod: 25

## 2022-05-24 RX ORDER — CYANOCOBALAMIN 1000 UG/ML
1000 INJECTION INTRAMUSCULAR; SUBCUTANEOUS
Qty: 0 | Refills: 0 | Status: COMPLETED | OUTPATIENT
Start: 2022-05-24

## 2022-05-24 RX ADMIN — CYANOCOBALAMIN 0 MCG/ML: 1000 INJECTION INTRAMUSCULAR; SUBCUTANEOUS at 00:00

## 2022-05-24 NOTE — ASSESSMENT
[FreeTextEntry1] : Hemodynamically stable with acceptable BP\par Findings otherwise consistent with history\par Lab profiles drawn in office and sent\par B12 1ml IM given R deltoid

## 2022-05-24 NOTE — REVIEW OF SYSTEMS
[Joint Pain] : joint pain [Muscle Weakness] : muscle weakness [Muscle Pain] : muscle pain [Back Pain] : back pain [Negative] : Genitourinary [FreeTextEntry9] : consistent with history

## 2022-05-24 NOTE — PHYSICAL EXAM
[No Acute Distress] : no acute distress [Normal] : normal rate, regular rhythm, normal S1 and S2 and no murmur heard [No Edema] : there was no peripheral edema [Soft] : abdomen soft [Non Tender] : non-tender [Normal Posterior Cervical Nodes] : no posterior cervical lymphadenopathy [Normal Anterior Cervical Nodes] : no anterior cervical lymphadenopathy [Muscle Spasms, Bilateral] : bilateral muscle spasms [Motor Strength Lower Extremities Bilaterally] : there was weakness in both lower extremities [No Focal Deficits] : no focal deficits [Speech Grossly Normal] : speech grossly normal [Memory Grossly Normal] : memory grossly normal [Normal Affect] : the affect was normal [Alert and Oriented x3] : oriented to person, place, and time [Normal Mood] : the mood was normal [Normal Insight/Judgement] : insight and judgment were intact

## 2022-05-24 NOTE — HISTORY OF PRESENT ILLNESS
[de-identified] : Presents for BP check, labs, and general follow-up.  Also due for B12 injection.  Reviewed medication and renewed as requested - note using pain medication appropriately to maintain good daily functioning and QOL with no adverse effects.  States diet has been less than optimal - under stress - about to move to [place] - driving down within the week.

## 2022-05-25 LAB
25(OH)D3 SERPL-MCNC: 39.2 NG/ML
ALBUMIN SERPL ELPH-MCNC: 4.5 G/DL
ALP BLD-CCNC: 80 U/L
ALT SERPL-CCNC: 10 U/L
ANION GAP SERPL CALC-SCNC: 12 MMOL/L
AST SERPL-CCNC: 12 U/L
BASOPHILS # BLD AUTO: 0.04 K/UL
BASOPHILS NFR BLD AUTO: 0.5 %
BILIRUB SERPL-MCNC: 0.3 MG/DL
BUN SERPL-MCNC: 10 MG/DL
CALCIUM SERPL-MCNC: 9.4 MG/DL
CHLORIDE SERPL-SCNC: 106 MMOL/L
CHOLEST SERPL-MCNC: 160 MG/DL
CO2 SERPL-SCNC: 23 MMOL/L
CREAT SERPL-MCNC: 0.89 MG/DL
EGFR: 80 ML/MIN/1.73M2
EOSINOPHIL # BLD AUTO: 0.15 K/UL
EOSINOPHIL NFR BLD AUTO: 2 %
ESTIMATED AVERAGE GLUCOSE: 120 MG/DL
FOLATE SERPL-MCNC: 10.7 NG/ML
GLUCOSE SERPL-MCNC: 153 MG/DL
HBA1C MFR BLD HPLC: 5.8 %
HCT VFR BLD CALC: 39.9 %
HDLC SERPL-MCNC: 55 MG/DL
HGB BLD-MCNC: 12.5 G/DL
IMM GRANULOCYTES NFR BLD AUTO: 0.4 %
LDLC SERPL CALC-MCNC: 86 MG/DL
LYMPHOCYTES # BLD AUTO: 2.3 K/UL
LYMPHOCYTES NFR BLD AUTO: 30.5 %
MAN DIFF?: NORMAL
MCHC RBC-ENTMCNC: 26.3 PG
MCHC RBC-ENTMCNC: 31.3 GM/DL
MCV RBC AUTO: 84 FL
MONOCYTES # BLD AUTO: 0.53 K/UL
MONOCYTES NFR BLD AUTO: 7 %
NEUTROPHILS # BLD AUTO: 4.48 K/UL
NEUTROPHILS NFR BLD AUTO: 59.6 %
NONHDLC SERPL-MCNC: 105 MG/DL
PLATELET # BLD AUTO: 319 K/UL
POTASSIUM SERPL-SCNC: 3.7 MMOL/L
PROT SERPL-MCNC: 6.7 G/DL
RBC # BLD: 4.75 M/UL
RBC # FLD: 14.4 %
SODIUM SERPL-SCNC: 142 MMOL/L
T4 FREE SERPL-MCNC: 1.2 NG/DL
TRIGL SERPL-MCNC: 96 MG/DL
TSH SERPL-ACNC: 1.19 UIU/ML
VIT B12 SERPL-MCNC: 404 PG/ML
WBC # FLD AUTO: 7.53 K/UL

## 2022-07-14 NOTE — ASSESSMENT
[FreeTextEntry1] : Hemodynamically stable with acceptable BP\par Lab profiles drawn in office and sent\par B12 1ml IM given R deltoid
GEN: NAD, looks comfortable  Psych: Mood appropriate  Neuro: A&Ox3.  No focal deficits.  Moving all extremities, LUE weaker then RUE which is baseline   HEENT: No obvious abnormalities  CV: S1S2, regular, no murmurs appreciated.  No carotid bruits.  No JVD  Lungs: Clear B/L.  No wheezing, rales or rhonchi  ABD: Soft, non-tender, non-distended.  +Bowel sounds  : fuchs catheter in place   EXT: Warm and well perfused.  No peripheral edema noted  Musculoskeletal: Moving all extremities with normal ROM, no joint swelling  Incisions: previous sternotomy incision is clean dry and intact healing well, previous drain sites are clean dry and intact, healing well.

## 2022-07-28 ENCOUNTER — APPOINTMENT (OUTPATIENT)
Dept: FAMILY MEDICINE | Facility: CLINIC | Age: 47
End: 2022-07-28

## 2022-07-28 VITALS — DIASTOLIC BLOOD PRESSURE: 80 MMHG | RESPIRATION RATE: 20 BRPM | HEART RATE: 76 BPM | SYSTOLIC BLOOD PRESSURE: 125 MMHG

## 2022-07-28 DIAGNOSIS — E78.5 HYPERLIPIDEMIA, UNSPECIFIED: ICD-10-CM

## 2022-07-28 DIAGNOSIS — E53.8 DEFICIENCY OF OTHER SPECIFIED B GROUP VITAMINS: ICD-10-CM

## 2022-07-28 DIAGNOSIS — I10 ESSENTIAL (PRIMARY) HYPERTENSION: ICD-10-CM

## 2022-07-28 DIAGNOSIS — E11.9 TYPE 2 DIABETES MELLITUS W/OUT COMPLICATIONS: ICD-10-CM

## 2022-07-28 PROCEDURE — 99214 OFFICE O/P EST MOD 30 MIN: CPT | Mod: 25

## 2022-07-28 PROCEDURE — 96372 THER/PROPH/DIAG INJ SC/IM: CPT

## 2022-07-28 PROCEDURE — 36415 COLL VENOUS BLD VENIPUNCTURE: CPT

## 2022-07-28 RX ORDER — COVID-19 MOLECULAR TEST ASSAY
KIT MISCELLANEOUS
Qty: 8 | Refills: 0 | Status: ACTIVE | COMMUNITY
Start: 2022-04-18

## 2022-07-28 RX ORDER — AMMONIUM LACTATE 12 %
12 CREAM (GRAM) TOPICAL
Qty: 385 | Refills: 0 | Status: ACTIVE | COMMUNITY
Start: 2022-04-25

## 2022-07-28 RX ORDER — CYANOCOBALAMIN 1000 UG/ML
1000 INJECTION INTRAMUSCULAR; SUBCUTANEOUS
Qty: 0 | Refills: 0 | Status: COMPLETED | OUTPATIENT
Start: 2022-07-28

## 2022-07-28 RX ORDER — BRIMONIDINE TARTRATE 2 MG/MG
0.2 SOLUTION/ DROPS OPHTHALMIC
Qty: 5 | Refills: 0 | Status: ACTIVE | COMMUNITY
Start: 2022-01-19

## 2022-07-28 RX ADMIN — CYANOCOBALAMIN 0 MCG/ML: 1000 INJECTION INTRAMUSCULAR; SUBCUTANEOUS at 00:00

## 2022-07-28 NOTE — HISTORY OF PRESENT ILLNESS
[de-identified] : Presents for BP check, labs, and general follow-up; also due for B12 injection.  Trying to watch diet.  Recently had COVID (fully vaccinated) but had mild symptoms; still has some congestion but is feeling otherwise much better.

## 2022-07-29 LAB
25(OH)D3 SERPL-MCNC: 41 NG/ML
ALBUMIN SERPL ELPH-MCNC: 4.7 G/DL
ALP BLD-CCNC: 79 U/L
ALT SERPL-CCNC: 16 U/L
ANION GAP SERPL CALC-SCNC: 14 MMOL/L
AST SERPL-CCNC: 17 U/L
BASOPHILS # BLD AUTO: 0.04 K/UL
BASOPHILS NFR BLD AUTO: 0.4 %
BILIRUB SERPL-MCNC: 0.4 MG/DL
BUN SERPL-MCNC: 11 MG/DL
CALCIUM SERPL-MCNC: 9 MG/DL
CHLORIDE SERPL-SCNC: 105 MMOL/L
CHOLEST SERPL-MCNC: 147 MG/DL
CO2 SERPL-SCNC: 21 MMOL/L
CREAT SERPL-MCNC: 0.82 MG/DL
EGFR: 88 ML/MIN/1.73M2
EOSINOPHIL # BLD AUTO: 0.02 K/UL
EOSINOPHIL NFR BLD AUTO: 0.2 %
ESTIMATED AVERAGE GLUCOSE: 114 MG/DL
GLUCOSE SERPL-MCNC: 103 MG/DL
HBA1C MFR BLD HPLC: 5.6 %
HCT VFR BLD CALC: 37.8 %
HDLC SERPL-MCNC: 54 MG/DL
HGB BLD-MCNC: 12.2 G/DL
IMM GRANULOCYTES NFR BLD AUTO: 0.2 %
LDLC SERPL CALC-MCNC: 81 MG/DL
LYMPHOCYTES # BLD AUTO: 2.02 K/UL
LYMPHOCYTES NFR BLD AUTO: 22.5 %
MAN DIFF?: NORMAL
MCHC RBC-ENTMCNC: 27.1 PG
MCHC RBC-ENTMCNC: 32.3 GM/DL
MCV RBC AUTO: 84 FL
MONOCYTES # BLD AUTO: 0.77 K/UL
MONOCYTES NFR BLD AUTO: 8.6 %
NEUTROPHILS # BLD AUTO: 6.1 K/UL
NEUTROPHILS NFR BLD AUTO: 68.1 %
NONHDLC SERPL-MCNC: 93 MG/DL
PLATELET # BLD AUTO: 313 K/UL
POTASSIUM SERPL-SCNC: 3.4 MMOL/L
PROT SERPL-MCNC: 6.9 G/DL
RBC # BLD: 4.5 M/UL
RBC # FLD: 15.4 %
SODIUM SERPL-SCNC: 140 MMOL/L
TRIGL SERPL-MCNC: 61 MG/DL
VIT B12 SERPL-MCNC: 433 PG/ML
WBC # FLD AUTO: 8.97 K/UL

## 2022-08-15 ENCOUNTER — RX RENEWAL (OUTPATIENT)
Age: 47
End: 2022-08-15

## 2022-08-29 ENCOUNTER — NON-APPOINTMENT (OUTPATIENT)
Age: 47
End: 2022-08-29

## 2022-08-29 RX ORDER — ALPRAZOLAM 2 MG/1
2 TABLET ORAL DAILY
Qty: 30 | Refills: 0 | Status: ACTIVE | COMMUNITY
Start: 2021-01-13 | End: 1900-01-01

## 2022-11-04 ENCOUNTER — RX RENEWAL (OUTPATIENT)
Age: 47
End: 2022-11-04

## 2022-11-04 RX ORDER — METHYLPREDNISOLONE 4 MG/1
4 TABLET ORAL
Qty: 1 | Refills: 0 | Status: COMPLETED | COMMUNITY
Start: 2020-08-03 | End: 2022-11-04

## 2022-11-08 ENCOUNTER — RX RENEWAL (OUTPATIENT)
Age: 47
End: 2022-11-08

## 2023-01-06 ENCOUNTER — RX RENEWAL (OUTPATIENT)
Age: 48
End: 2023-01-06

## 2023-01-06 RX ORDER — LOSARTAN POTASSIUM 100 MG/1
100 TABLET, FILM COATED ORAL
Qty: 90 | Refills: 0 | Status: ACTIVE | COMMUNITY
Start: 2019-04-22 | End: 1900-01-01

## 2023-01-30 ENCOUNTER — OUTPATIENT (OUTPATIENT)
Dept: OUTPATIENT SERVICES | Facility: HOSPITAL | Age: 48
LOS: 1 days | End: 2023-01-30
Payer: MEDICARE

## 2023-01-30 ENCOUNTER — APPOINTMENT (OUTPATIENT)
Dept: MRI IMAGING | Facility: HOSPITAL | Age: 48
End: 2023-01-30
Payer: MEDICARE

## 2023-01-30 DIAGNOSIS — Z00.8 ENCOUNTER FOR OTHER GENERAL EXAMINATION: ICD-10-CM

## 2023-01-30 PROCEDURE — 73718 MRI LOWER EXTREMITY W/O DYE: CPT | Mod: 26,LT,MH

## 2023-01-30 PROCEDURE — 73221 MRI JOINT UPR EXTREM W/O DYE: CPT | Mod: 26,RT,MH

## 2023-01-30 PROCEDURE — 73718 MRI LOWER EXTREMITY W/O DYE: CPT | Mod: MH

## 2023-01-30 PROCEDURE — 73221 MRI JOINT UPR EXTREM W/O DYE: CPT | Mod: MH

## 2023-02-01 ENCOUNTER — APPOINTMENT (OUTPATIENT)
Dept: FAMILY MEDICINE | Facility: CLINIC | Age: 48
End: 2023-02-01
Payer: MEDICARE

## 2023-02-01 DIAGNOSIS — J45.909 UNSPECIFIED ASTHMA, UNCOMPLICATED: ICD-10-CM

## 2023-02-01 DIAGNOSIS — G89.29 OTHER CHRONIC PAIN: ICD-10-CM

## 2023-02-01 DIAGNOSIS — F41.9 ANXIETY DISORDER, UNSPECIFIED: ICD-10-CM

## 2023-02-01 PROCEDURE — 99214 OFFICE O/P EST MOD 30 MIN: CPT | Mod: 95

## 2023-02-01 NOTE — ASSESSMENT
[FreeTextEntry1] : Asthma stable at present\par Anxiety appears well controlled with proper use of medication\par Long-standing arthropathy with chronic pain syndrome

## 2023-02-01 NOTE — PHYSICAL EXAM
[No Acute Distress] : no acute distress [Normal Sclera/Conjunctiva] : normal sclera/conjunctiva [PERRL] : pupils equal round and reactive to light [EOMI] : extraocular movements intact [No Respiratory Distress] : no respiratory distress  [Speech Grossly Normal] : speech grossly normal [Memory Grossly Normal] : memory grossly normal [Normal Affect] : the affect was normal [Alert and Oriented x3] : oriented to person, place, and time [Normal Mood] : the mood was normal [Normal Insight/Judgement] : insight and judgment were intact [de-identified] : per video - alert, conversant, in good spirits [de-identified] : per video [de-identified] : appears normal ROM [de-identified] : per video; speech fluent without evidence of dyspnea [de-identified] : speech fluent and clear; no facial asymmetry

## 2023-02-01 NOTE — REVIEW OF SYSTEMS
[Joint Pain] : joint pain [Muscle Weakness] : muscle weakness [Muscle Pain] : muscle pain [Back Pain] : back pain [Negative] : Psychiatric [FreeTextEntry9] : consistent with history

## 2023-02-01 NOTE — HISTORY OF PRESENT ILLNESS
[Home] : at home, [unfilled] , at the time of the visit. [Medical Office: ([facility])___] : at the medical office located in  [Verbal consent obtained from patient] : the patient, [unfilled] [de-identified] : Telehealth visit requested by patient for general follow-up.  Pt states having issues finding physician coverage since moving from the area - does have a Psychiatrist but is awaiting evaluation by Pain Management - states new patient appointments are many months away.  Using all medication appropriately to maintain good daily functioning and QOL.  Note pt is alert, conversant, with no evidence of intoxication or euphoria.  Otherwise pt states feeling generally well; trying to eat healthy; denies new breathing issues.

## 2023-03-20 ENCOUNTER — RX RENEWAL (OUTPATIENT)
Age: 48
End: 2023-03-20

## 2023-04-28 ENCOUNTER — RX RENEWAL (OUTPATIENT)
Age: 48
End: 2023-04-28

## 2023-05-15 RX ORDER — OXYCODONE HYDROCHLORIDE 15 MG/1
15 TABLET ORAL
Qty: 150 | Refills: 0 | Status: ACTIVE | COMMUNITY
Start: 2021-10-04 | End: 1900-01-01

## 2023-08-07 ENCOUNTER — RX RENEWAL (OUTPATIENT)
Age: 48
End: 2023-08-07

## 2023-09-11 NOTE — PHYSICAL EXAM
[Cane] : ambulates with cane [Normal RUE] : Right Upper Extremity: No scars, rashes, lesions, ulcers, skin intact [Normal LUE] : Left Upper Extremity: No scars, rashes, lesions, ulcers, skin intact [Obese] : obese [Poor Appearance] : well-appearing [Acute Distress] : not in acute distress [de-identified] : Cervical Spine/Neck\par Inspection/Palpation :\par ¦ Inspection : alignment midline, normal degree of lordosis present, thoracic kyphosis noted\par ¦ Skin : normal appearance, no masses, no tenderness, trachea midline, \par ¦ Palpation : left paraspinal and trapezius musculature is mildly tender to palpation with palpable tightness noted. \par ¦ Tests and Signs : Spurling’s (-), Lhermitte’s (-)\par ¦ Range of Motion : arc of motion full in all planes, no crepitus,  pain with left rotation \par ¦ Stability : no subluxations or other evidence of instability demonstrated during range of motion testing\par o Muscle Strength : paraspinal muscle strength within normal limits\par o Muscle Tone : paraspinal muscle tone within normal limits\par o Muscle Bulk : normal, no atrophy\par o Cervical Lymph Nodes : no lymphadenopathy present\par ¦ Upper Extremity Strength : elbow flexion and extension 5/5, wrist extension, flexion, ulnar deviation and radial deviation 5/5, all intrinsic and extrinsic hand muscles 5/5\par o Special Tests: Lucas’s Reflex (-) \par  \par Right Upper Extremity\par o Shoulder :\par ¦ Inspection/Palpation :no tenderness, no swelling, no deformities\par ¦ Range of Motion : ACTIVE FORWARD ELEVATION: Measured at 170 degrees, ACTIVE EXTERNAL ROTATION: Measured at 75 degrees, ACTIVE INTERNAL ROTATION: Measured at T10, ACTIVE  ABDUCTION: Measured at 160 degrees \par ¦ Strength : external rotation 5/5, internal rotation 5/5, supraspinatus 5/5 \par ¦ Stability : no joint instability on provocative testing\par ¦ Tests/Signs : Neer (-), Abbott (+), Speed’s Test (-) \par o Upper Arm : no tenderness, no swelling, no deformities\par o Muscle Bulk : no atrophy\par o Sensation : sensation intact to light touch\par o Skin : no skin rash or discoloration\par o Vascular Exam : no edema, no cyanosis, radial and ulnar pulses normal \par \par Left Upper Extremity\par o Shoulder :\par ¦ Inspection/Palpation : mild tenderness over the greater tuberosity and over the proximal biceps tendon, no swelling, no deformities\par ¦ Range of Motion : ACTIVE FORWARD ELEVATION: Measured at 170 degrees, ACTIVE EXTERNAL ROTATION: Measured at 75 degrees, ACTIVE INTERNAL ROTATION: Measured at T6, ACTIVE ABDUCTION: measured at 160 degrees \par ¦ Strength : external rotation 5/5, internal rotation 5/5, supraspinatus 5/5 all with pain\par ¦ Stability : no joint instability on provocative testing\par ¦ Tests/Signs : Neer (+), Abbott (+), Speed’s Test (+) \par o Upper Arm : no tenderness, no swelling, no deformities\par o Muscle Bulk : no atrophy\par o Sensation : sensation intact to light touch\par o Skin : no skin rash or discoloration\par o Vascular Exam : no edema, no cyanosis, radial and ulnar pulses normal  denies

## 2024-12-10 NOTE — ED PROVIDER NOTE - PSH
Started patient on an H2 blocker.  Will continue for 4-8 weeks. If not improving or worsening symptoms,return for further evaluation. Dietary changes were also discussed. Will decrease foods and drinks that may exacerbate symptoms.   Return in 1-2 months for follow up if directed.    Continue with his asthma medications as previously prescribed.  
No significant past surgical history